# Patient Record
Sex: MALE | Race: WHITE | NOT HISPANIC OR LATINO | Employment: OTHER | URBAN - NONMETROPOLITAN AREA
[De-identification: names, ages, dates, MRNs, and addresses within clinical notes are randomized per-mention and may not be internally consistent; named-entity substitution may affect disease eponyms.]

---

## 2017-03-24 ENCOUNTER — TRANSCRIBE ORDERS (OUTPATIENT)
Dept: ADMINISTRATIVE | Facility: HOSPITAL | Age: 62
End: 2017-03-24

## 2017-03-24 ENCOUNTER — HOSPITAL ENCOUNTER (OUTPATIENT)
Dept: CT IMAGING | Facility: HOSPITAL | Age: 62
Discharge: HOME | End: 2017-03-24

## 2017-03-24 DIAGNOSIS — Z13.9 SCREENING: Primary | ICD-10-CM

## 2017-03-24 PROCEDURE — G0297 LDCT FOR LUNG CA SCREEN: HCPCS

## 2018-05-16 ENCOUNTER — HOSPITAL ENCOUNTER (OUTPATIENT)
Dept: GENERAL RADIOLOGY | Facility: HOSPITAL | Age: 63
Discharge: HOME | End: 2018-05-16
Attending: FAMILY MEDICINE

## 2018-05-16 PROCEDURE — 71046 X-RAY EXAM CHEST 2 VIEWS: CPT

## 2018-07-17 ENCOUNTER — TRANSCRIBE ORDERS (OUTPATIENT)
Dept: ADMINISTRATIVE | Facility: HOSPITAL | Age: 63
End: 2018-07-17

## 2018-07-17 ENCOUNTER — HOSPITAL ENCOUNTER (OUTPATIENT)
Dept: CT IMAGING | Facility: HOSPITAL | Age: 63
Discharge: HOME | End: 2018-07-17

## 2018-07-17 DIAGNOSIS — Z13.9 SCREENING FOR CONDITION: ICD-10-CM

## 2018-07-17 DIAGNOSIS — Z13.9 SCREENING FOR CONDITION: Primary | ICD-10-CM

## 2018-07-17 PROCEDURE — G0297 LDCT FOR LUNG CA SCREEN: HCPCS

## 2019-12-27 ENCOUNTER — HOSPITAL ENCOUNTER (OUTPATIENT)
Dept: GENERAL RADIOLOGY | Age: 64
Discharge: HOME | End: 2019-12-27
Payer: MEDICARE

## 2019-12-27 DIAGNOSIS — J06.9 ACUTE RESPIRATORY DISEASE: ICD-10-CM

## 2019-12-27 PROCEDURE — 71046 X-RAY EXAM CHEST 2 VIEWS: CPT

## 2020-01-28 ENCOUNTER — HOSPITAL ENCOUNTER (OUTPATIENT)
Dept: CT IMAGING | Facility: HOSPITAL | Age: 65
Discharge: HOME | End: 2020-01-28

## 2020-01-28 ENCOUNTER — TRANSCRIBE ORDERS (OUTPATIENT)
Dept: ADMINISTRATIVE | Facility: HOSPITAL | Age: 65
End: 2020-01-28

## 2020-01-28 DIAGNOSIS — Z13.9 SCREENING FOR UNSPECIFIED CONDITION: Primary | ICD-10-CM

## 2020-01-28 PROCEDURE — G0297 LDCT FOR LUNG CA SCREEN: HCPCS

## 2021-03-19 ENCOUNTER — BULK ORDERING (OUTPATIENT)
Dept: CASE MANAGEMENT | Facility: OTHER | Age: 66
End: 2021-03-19

## 2021-03-19 DIAGNOSIS — Z23 IMMUNIZATION DUE: ICD-10-CM

## 2021-03-25 ENCOUNTER — TELEPHONE (OUTPATIENT)
Dept: SURGERY | Age: 66
End: 2021-03-25

## 2021-03-29 ENCOUNTER — HOSPITAL ENCOUNTER (OUTPATIENT)
Dept: ULTRASOUND IMAGING | Age: 66
Discharge: HOME | End: 2021-03-29
Payer: MEDICARE

## 2021-03-29 DIAGNOSIS — K80.10 CALCULUS OF GALLBLADDER WITH CHOLECYSTITIS WITHOUT BILIARY OBSTRUCTION, UNSPECIFIED CHOLECYSTITIS ACUITY: ICD-10-CM

## 2021-03-29 PROCEDURE — 76705 ECHO EXAM OF ABDOMEN: CPT

## 2021-03-31 ENCOUNTER — OFFICE VISIT (OUTPATIENT)
Dept: SURGERY | Age: 66
End: 2021-03-31
Payer: MEDICARE

## 2021-03-31 VITALS
HEIGHT: 72 IN | HEART RATE: 91 BPM | WEIGHT: 242 LBS | BODY MASS INDEX: 32.78 KG/M2 | RESPIRATION RATE: 18 BRPM | SYSTOLIC BLOOD PRESSURE: 124 MMHG | TEMPERATURE: 97.7 F | DIASTOLIC BLOOD PRESSURE: 72 MMHG

## 2021-03-31 DIAGNOSIS — K80.10 CALCULUS OF GALLBLADDER WITH CHRONIC CHOLECYSTITIS WITHOUT OBSTRUCTION: Primary | ICD-10-CM

## 2021-03-31 PROCEDURE — 99203 OFFICE O/P NEW LOW 30 MIN: CPT | Performed by: PHYSICIAN ASSISTANT

## 2021-03-31 PROCEDURE — G8484 FLU IMMUNIZE NO ADMIN: HCPCS | Performed by: PHYSICIAN ASSISTANT

## 2021-03-31 PROCEDURE — G8417 CALC BMI ABV UP PARAM F/U: HCPCS | Performed by: PHYSICIAN ASSISTANT

## 2021-03-31 PROCEDURE — 4040F PNEUMOC VAC/ADMIN/RCVD: CPT | Performed by: PHYSICIAN ASSISTANT

## 2021-03-31 PROCEDURE — 3017F COLORECTAL CA SCREEN DOC REV: CPT | Performed by: PHYSICIAN ASSISTANT

## 2021-03-31 PROCEDURE — 1123F ACP DISCUSS/DSCN MKR DOCD: CPT | Performed by: PHYSICIAN ASSISTANT

## 2021-03-31 PROCEDURE — 1036F TOBACCO NON-USER: CPT | Performed by: PHYSICIAN ASSISTANT

## 2021-03-31 PROCEDURE — G8427 DOCREV CUR MEDS BY ELIG CLIN: HCPCS | Performed by: PHYSICIAN ASSISTANT

## 2021-03-31 RX ORDER — OMEPRAZOLE 20 MG/1
20 CAPSULE, DELAYED RELEASE ORAL DAILY
COMMUNITY
Start: 2021-03-18

## 2021-03-31 RX ORDER — PIOGLITAZONEHYDROCHLORIDE 30 MG/1
30 TABLET ORAL DAILY
COMMUNITY
Start: 2021-03-18

## 2021-03-31 RX ORDER — GLIMEPIRIDE 4 MG/1
4 TABLET ORAL
COMMUNITY

## 2021-03-31 RX ORDER — BENAZEPRIL HYDROCHLORIDE 10 MG/1
10 TABLET ORAL DAILY
COMMUNITY
Start: 2021-03-13

## 2021-03-31 RX ORDER — FENOFIBRATE 145 MG/1
145 TABLET, COATED ORAL DAILY
COMMUNITY
Start: 2021-01-06

## 2021-03-31 RX ORDER — ATORVASTATIN CALCIUM 10 MG/1
10 TABLET, FILM COATED ORAL DAILY
COMMUNITY
Start: 2021-01-05

## 2021-03-31 SDOH — HEALTH STABILITY: MENTAL HEALTH: HOW OFTEN DO YOU HAVE A DRINK CONTAINING ALCOHOL?: 4 OR MORE TIMES A WEEK

## 2021-03-31 SDOH — HEALTH STABILITY: MENTAL HEALTH: HOW MANY STANDARD DRINKS CONTAINING ALCOHOL DO YOU HAVE ON A TYPICAL DAY?: 1 OR 2

## 2021-04-07 ENCOUNTER — HOSPITAL ENCOUNTER (OUTPATIENT)
Dept: PREADMISSION TESTING | Age: 66
Discharge: HOME | End: 2021-04-11
Payer: MEDICARE

## 2021-04-07 VITALS — BODY MASS INDEX: 32.64 KG/M2 | HEIGHT: 72 IN | WEIGHT: 241 LBS

## 2021-04-07 LAB
ALBUMIN SERPL-MCNC: 4.4 G/DL (ref 3.5–5.2)
ALP BLD-CCNC: 63 U/L (ref 40–130)
ALT SERPL-CCNC: 17 U/L (ref 5–41)
ANION GAP SERPL CALCULATED.3IONS-SCNC: 11 MMOL/L (ref 7–19)
AST SERPL-CCNC: 17 U/L (ref 5–40)
BASOPHILS ABSOLUTE: 0.1 K/UL (ref 0–0.2)
BASOPHILS RELATIVE PERCENT: 0.7 % (ref 0–1)
BILIRUB SERPL-MCNC: 0.4 MG/DL (ref 0.2–1.2)
BUN BLDV-MCNC: 19 MG/DL (ref 8–23)
CALCIUM SERPL-MCNC: 9.1 MG/DL (ref 8.8–10.2)
CHLORIDE BLD-SCNC: 102 MMOL/L (ref 98–111)
CO2: 24 MMOL/L (ref 22–29)
CREAT SERPL-MCNC: 1 MG/DL (ref 0.5–1.2)
EKG P AXIS: 49 DEGREES
EKG P-R INTERVAL: 180 MS
EKG Q-T INTERVAL: 368 MS
EKG QRS DURATION: 84 MS
EKG QTC CALCULATION (BAZETT): 413 MS
EKG T AXIS: 35 DEGREES
EOSINOPHILS ABSOLUTE: 0.1 K/UL (ref 0–0.6)
EOSINOPHILS RELATIVE PERCENT: 1.3 % (ref 0–5)
GFR AFRICAN AMERICAN: >59
GFR NON-AFRICAN AMERICAN: >60
GLUCOSE BLD-MCNC: 159 MG/DL (ref 74–109)
HCT VFR BLD CALC: 48.1 % (ref 42–52)
HEMOGLOBIN: 16 G/DL (ref 14–18)
IMMATURE GRANULOCYTES #: 0 K/UL
LYMPHOCYTES ABSOLUTE: 2.1 K/UL (ref 1.1–4.5)
LYMPHOCYTES RELATIVE PERCENT: 29.9 % (ref 20–40)
MCH RBC QN AUTO: 30.8 PG (ref 27–31)
MCHC RBC AUTO-ENTMCNC: 33.3 G/DL (ref 33–37)
MCV RBC AUTO: 92.7 FL (ref 80–94)
MONOCYTES ABSOLUTE: 0.7 K/UL (ref 0–0.9)
MONOCYTES RELATIVE PERCENT: 9.7 % (ref 0–10)
NEUTROPHILS ABSOLUTE: 4.1 K/UL (ref 1.5–7.5)
NEUTROPHILS RELATIVE PERCENT: 57.8 % (ref 50–65)
PDW BLD-RTO: 12.8 % (ref 11.5–14.5)
PLATELET # BLD: 280 K/UL (ref 130–400)
PMV BLD AUTO: 10.9 FL (ref 9.4–12.4)
POTASSIUM SERPL-SCNC: 4.4 MMOL/L (ref 3.5–5)
RBC # BLD: 5.19 M/UL (ref 4.7–6.1)
SARS-COV-2, PCR: NOT DETECTED
SODIUM BLD-SCNC: 137 MMOL/L (ref 136–145)
TOTAL PROTEIN: 6.9 G/DL (ref 6.6–8.7)
WBC # BLD: 7 K/UL (ref 4.8–10.8)

## 2021-04-07 PROCEDURE — 80053 COMPREHEN METABOLIC PANEL: CPT

## 2021-04-07 PROCEDURE — 93005 ELECTROCARDIOGRAM TRACING: CPT | Performed by: SURGERY

## 2021-04-07 PROCEDURE — U0003 INFECTIOUS AGENT DETECTION BY NUCLEIC ACID (DNA OR RNA); SEVERE ACUTE RESPIRATORY SYNDROME CORONAVIRUS 2 (SARS-COV-2) (CORONAVIRUS DISEASE [COVID-19]), AMPLIFIED PROBE TECHNIQUE, MAKING USE OF HIGH THROUGHPUT TECHNOLOGIES AS DESCRIBED BY CMS-2020-01-R: HCPCS

## 2021-04-07 PROCEDURE — U0005 INFEC AGEN DETEC AMPLI PROBE: HCPCS

## 2021-04-07 PROCEDURE — 85025 COMPLETE CBC W/AUTO DIFF WBC: CPT

## 2021-04-07 RX ORDER — ASPIRIN 81 MG/1
81 TABLET ORAL DAILY
COMMUNITY

## 2021-04-07 RX ORDER — SEMAGLUTIDE 1.34 MG/ML
1 INJECTION, SOLUTION SUBCUTANEOUS WEEKLY
COMMUNITY

## 2021-04-08 PROBLEM — K80.10 CALCULUS OF GALLBLADDER WITH CHRONIC CHOLECYSTITIS WITHOUT OBSTRUCTION: Status: ACTIVE | Noted: 2021-04-08

## 2021-04-09 ENCOUNTER — HOSPITAL ENCOUNTER (OUTPATIENT)
Age: 66
Setting detail: OUTPATIENT SURGERY
Discharge: HOME | End: 2021-04-09
Attending: SURGERY | Admitting: SURGERY
Payer: MEDICARE

## 2021-04-09 ENCOUNTER — ANESTHESIA EVENT (OUTPATIENT)
Dept: OPERATING ROOM | Age: 66
End: 2021-04-09
Payer: MEDICARE

## 2021-04-09 ENCOUNTER — ANESTHESIA (OUTPATIENT)
Dept: OPERATING ROOM | Age: 66
End: 2021-04-09
Payer: MEDICARE

## 2021-04-09 VITALS
HEART RATE: 95 BPM | DIASTOLIC BLOOD PRESSURE: 84 MMHG | HEIGHT: 72 IN | TEMPERATURE: 97.8 F | BODY MASS INDEX: 32.64 KG/M2 | SYSTOLIC BLOOD PRESSURE: 132 MMHG | RESPIRATION RATE: 20 BRPM | WEIGHT: 241 LBS | OXYGEN SATURATION: 93 %

## 2021-04-09 VITALS — OXYGEN SATURATION: 91 % | DIASTOLIC BLOOD PRESSURE: 66 MMHG | SYSTOLIC BLOOD PRESSURE: 119 MMHG | TEMPERATURE: 97.3 F

## 2021-04-09 DIAGNOSIS — K80.10 CALCULUS OF GALLBLADDER WITH CHRONIC CHOLECYSTITIS WITHOUT OBSTRUCTION: Primary | ICD-10-CM

## 2021-04-09 PROCEDURE — 2580000003 HC RX 258: Performed by: SURGERY

## 2021-04-09 PROCEDURE — 2580000003 HC RX 258: Performed by: ANESTHESIOLOGY

## 2021-04-09 PROCEDURE — 88304 TISSUE EXAM BY PATHOLOGIST: CPT

## 2021-04-09 PROCEDURE — 3600000009 HC SURGERY ROBOT BASE: Performed by: SURGERY

## 2021-04-09 PROCEDURE — 6360000002 HC RX W HCPCS

## 2021-04-09 PROCEDURE — 3700000001 HC ADD 15 MINUTES (ANESTHESIA): Performed by: SURGERY

## 2021-04-09 PROCEDURE — 7100000010 HC PHASE II RECOVERY - FIRST 15 MIN: Performed by: SURGERY

## 2021-04-09 PROCEDURE — 2500000003 HC RX 250 WO HCPCS

## 2021-04-09 PROCEDURE — C9290 INJ, BUPIVACAINE LIPOSOME: HCPCS | Performed by: SURGERY

## 2021-04-09 PROCEDURE — 6360000002 HC RX W HCPCS: Performed by: SURGERY

## 2021-04-09 PROCEDURE — S2900 ROBOTIC SURGICAL SYSTEM: HCPCS | Performed by: SURGERY

## 2021-04-09 PROCEDURE — 6370000000 HC RX 637 (ALT 250 FOR IP): Performed by: ANESTHESIOLOGY

## 2021-04-09 PROCEDURE — 6360000002 HC RX W HCPCS: Performed by: ANESTHESIOLOGY

## 2021-04-09 PROCEDURE — 2500000003 HC RX 250 WO HCPCS: Performed by: SURGERY

## 2021-04-09 PROCEDURE — 2709999900 HC NON-CHARGEABLE SUPPLY: Performed by: SURGERY

## 2021-04-09 PROCEDURE — 2780000010 HC IMPLANT OTHER: Performed by: SURGERY

## 2021-04-09 PROCEDURE — 7100000011 HC PHASE II RECOVERY - ADDTL 15 MIN: Performed by: SURGERY

## 2021-04-09 PROCEDURE — 7100000001 HC PACU RECOVERY - ADDTL 15 MIN: Performed by: SURGERY

## 2021-04-09 PROCEDURE — 47562 LAPAROSCOPIC CHOLECYSTECTOMY: CPT | Performed by: SURGERY

## 2021-04-09 PROCEDURE — 7100000000 HC PACU RECOVERY - FIRST 15 MIN: Performed by: SURGERY

## 2021-04-09 PROCEDURE — 3700000000 HC ANESTHESIA ATTENDED CARE: Performed by: SURGERY

## 2021-04-09 PROCEDURE — 3600000019 HC SURGERY ROBOT ADDTL 15MIN: Performed by: SURGERY

## 2021-04-09 RX ORDER — MORPHINE SULFATE 4 MG/ML
4 INJECTION, SOLUTION INTRAMUSCULAR; INTRAVENOUS
Status: DISCONTINUED | OUTPATIENT
Start: 2021-04-09 | End: 2021-04-09 | Stop reason: HOSPADM

## 2021-04-09 RX ORDER — ONDANSETRON 2 MG/ML
INJECTION INTRAMUSCULAR; INTRAVENOUS PRN
Status: DISCONTINUED | OUTPATIENT
Start: 2021-04-09 | End: 2021-04-09 | Stop reason: SDUPTHER

## 2021-04-09 RX ORDER — HYDRALAZINE HYDROCHLORIDE 20 MG/ML
5 INJECTION INTRAMUSCULAR; INTRAVENOUS EVERY 10 MIN PRN
Status: DISCONTINUED | OUTPATIENT
Start: 2021-04-09 | End: 2021-04-09 | Stop reason: HOSPADM

## 2021-04-09 RX ORDER — PROMETHAZINE HYDROCHLORIDE 25 MG/ML
6.25 INJECTION, SOLUTION INTRAMUSCULAR; INTRAVENOUS
Status: DISCONTINUED | OUTPATIENT
Start: 2021-04-09 | End: 2021-04-09 | Stop reason: HOSPADM

## 2021-04-09 RX ORDER — DEXAMETHASONE SODIUM PHOSPHATE 10 MG/ML
INJECTION, SOLUTION INTRAMUSCULAR; INTRAVENOUS PRN
Status: DISCONTINUED | OUTPATIENT
Start: 2021-04-09 | End: 2021-04-09 | Stop reason: SDUPTHER

## 2021-04-09 RX ORDER — MEPERIDINE HYDROCHLORIDE 50 MG/ML
12.5 INJECTION INTRAMUSCULAR; INTRAVENOUS; SUBCUTANEOUS EVERY 5 MIN PRN
Status: DISCONTINUED | OUTPATIENT
Start: 2021-04-09 | End: 2021-04-09 | Stop reason: HOSPADM

## 2021-04-09 RX ORDER — FAMOTIDINE 20 MG/1
20 TABLET, FILM COATED ORAL
Status: COMPLETED | OUTPATIENT
Start: 2021-04-09 | End: 2021-04-09

## 2021-04-09 RX ORDER — FENTANYL CITRATE 50 UG/ML
INJECTION, SOLUTION INTRAMUSCULAR; INTRAVENOUS PRN
Status: DISCONTINUED | OUTPATIENT
Start: 2021-04-09 | End: 2021-04-09 | Stop reason: SDUPTHER

## 2021-04-09 RX ORDER — HEPARIN SODIUM 5000 [USP'U]/ML
5000 INJECTION, SOLUTION INTRAVENOUS; SUBCUTANEOUS ONCE
Status: COMPLETED | OUTPATIENT
Start: 2021-04-09 | End: 2021-04-09

## 2021-04-09 RX ORDER — SODIUM CHLORIDE 0.9 % (FLUSH) 0.9 %
5-40 SYRINGE (ML) INJECTION EVERY 12 HOURS SCHEDULED
Status: DISCONTINUED | OUTPATIENT
Start: 2021-04-09 | End: 2021-04-09 | Stop reason: HOSPADM

## 2021-04-09 RX ORDER — HYDROMORPHONE HYDROCHLORIDE 1 MG/ML
0.5 INJECTION, SOLUTION INTRAMUSCULAR; INTRAVENOUS; SUBCUTANEOUS EVERY 5 MIN PRN
Status: DISCONTINUED | OUTPATIENT
Start: 2021-04-09 | End: 2021-04-09 | Stop reason: HOSPADM

## 2021-04-09 RX ORDER — MORPHINE SULFATE 4 MG/ML
2 INJECTION, SOLUTION INTRAMUSCULAR; INTRAVENOUS EVERY 5 MIN PRN
Status: DISCONTINUED | OUTPATIENT
Start: 2021-04-09 | End: 2021-04-09 | Stop reason: HOSPADM

## 2021-04-09 RX ORDER — APREPITANT 40 MG/1
40 CAPSULE ORAL ONCE
Status: COMPLETED | OUTPATIENT
Start: 2021-04-09 | End: 2021-04-09

## 2021-04-09 RX ORDER — SCOLOPAMINE TRANSDERMAL SYSTEM 1 MG/1
1 PATCH, EXTENDED RELEASE TRANSDERMAL ONCE
Status: DISCONTINUED | OUTPATIENT
Start: 2021-04-09 | End: 2021-04-09 | Stop reason: HOSPADM

## 2021-04-09 RX ORDER — SODIUM CHLORIDE, SODIUM LACTATE, POTASSIUM CHLORIDE, CALCIUM CHLORIDE 600; 310; 30; 20 MG/100ML; MG/100ML; MG/100ML; MG/100ML
INJECTION, SOLUTION INTRAVENOUS CONTINUOUS
Status: DISCONTINUED | OUTPATIENT
Start: 2021-04-09 | End: 2021-04-09 | Stop reason: HOSPADM

## 2021-04-09 RX ORDER — METOCLOPRAMIDE 10 MG/1
10 TABLET ORAL ONCE
Status: COMPLETED | OUTPATIENT
Start: 2021-04-09 | End: 2021-04-09

## 2021-04-09 RX ORDER — ROCURONIUM BROMIDE 10 MG/ML
INJECTION, SOLUTION INTRAVENOUS PRN
Status: DISCONTINUED | OUTPATIENT
Start: 2021-04-09 | End: 2021-04-09 | Stop reason: SDUPTHER

## 2021-04-09 RX ORDER — SODIUM CHLORIDE 0.9 % (FLUSH) 0.9 %
5-40 SYRINGE (ML) INJECTION PRN
Status: DISCONTINUED | OUTPATIENT
Start: 2021-04-09 | End: 2021-04-09 | Stop reason: HOSPADM

## 2021-04-09 RX ORDER — MORPHINE SULFATE 4 MG/ML
4 INJECTION, SOLUTION INTRAMUSCULAR; INTRAVENOUS EVERY 5 MIN PRN
Status: DISCONTINUED | OUTPATIENT
Start: 2021-04-09 | End: 2021-04-09 | Stop reason: HOSPADM

## 2021-04-09 RX ORDER — HYDROCODONE BITARTRATE AND ACETAMINOPHEN 5; 325 MG/1; MG/1
1 TABLET ORAL EVERY 4 HOURS PRN
Qty: 18 TABLET | Refills: 0 | Status: SHIPPED | OUTPATIENT
Start: 2021-04-09 | End: 2021-04-12

## 2021-04-09 RX ORDER — EPHEDRINE SULFATE 50 MG/ML
INJECTION, SOLUTION INTRAVENOUS PRN
Status: DISCONTINUED | OUTPATIENT
Start: 2021-04-09 | End: 2021-04-09 | Stop reason: SDUPTHER

## 2021-04-09 RX ORDER — DIPHENHYDRAMINE HYDROCHLORIDE 50 MG/ML
12.5 INJECTION INTRAMUSCULAR; INTRAVENOUS
Status: DISCONTINUED | OUTPATIENT
Start: 2021-04-09 | End: 2021-04-09 | Stop reason: HOSPADM

## 2021-04-09 RX ORDER — INDOCYANINE GREEN AND WATER 25 MG
KIT INJECTION PRN
Status: DISCONTINUED | OUTPATIENT
Start: 2021-04-09 | End: 2021-04-09 | Stop reason: ALTCHOICE

## 2021-04-09 RX ORDER — HYDROXYZINE HYDROCHLORIDE 25 MG/ML
25 INJECTION, SOLUTION INTRAMUSCULAR
Status: DISCONTINUED | OUTPATIENT
Start: 2021-04-09 | End: 2021-04-09 | Stop reason: HOSPADM

## 2021-04-09 RX ORDER — LABETALOL HYDROCHLORIDE 5 MG/ML
5 INJECTION, SOLUTION INTRAVENOUS EVERY 10 MIN PRN
Status: DISCONTINUED | OUTPATIENT
Start: 2021-04-09 | End: 2021-04-09 | Stop reason: HOSPADM

## 2021-04-09 RX ORDER — PROPOFOL 10 MG/ML
INJECTION, EMULSION INTRAVENOUS PRN
Status: DISCONTINUED | OUTPATIENT
Start: 2021-04-09 | End: 2021-04-09 | Stop reason: SDUPTHER

## 2021-04-09 RX ORDER — METOCLOPRAMIDE HYDROCHLORIDE 5 MG/ML
10 INJECTION INTRAMUSCULAR; INTRAVENOUS
Status: DISCONTINUED | OUTPATIENT
Start: 2021-04-09 | End: 2021-04-09 | Stop reason: HOSPADM

## 2021-04-09 RX ORDER — MIDAZOLAM HYDROCHLORIDE 1 MG/ML
2 INJECTION INTRAMUSCULAR; INTRAVENOUS
Status: COMPLETED | OUTPATIENT
Start: 2021-04-09 | End: 2021-04-09

## 2021-04-09 RX ORDER — SODIUM CHLORIDE 9 MG/ML
25 INJECTION, SOLUTION INTRAVENOUS PRN
Status: DISCONTINUED | OUTPATIENT
Start: 2021-04-09 | End: 2021-04-09 | Stop reason: HOSPADM

## 2021-04-09 RX ORDER — FENTANYL CITRATE 50 UG/ML
50 INJECTION, SOLUTION INTRAMUSCULAR; INTRAVENOUS
Status: DISCONTINUED | OUTPATIENT
Start: 2021-04-09 | End: 2021-04-09 | Stop reason: HOSPADM

## 2021-04-09 RX ORDER — LIDOCAINE HYDROCHLORIDE 10 MG/ML
1 INJECTION, SOLUTION EPIDURAL; INFILTRATION; INTRACAUDAL; PERINEURAL
Status: DISCONTINUED | OUTPATIENT
Start: 2021-04-09 | End: 2021-04-09 | Stop reason: HOSPADM

## 2021-04-09 RX ORDER — HYDROMORPHONE HYDROCHLORIDE 1 MG/ML
0.25 INJECTION, SOLUTION INTRAMUSCULAR; INTRAVENOUS; SUBCUTANEOUS EVERY 5 MIN PRN
Status: DISCONTINUED | OUTPATIENT
Start: 2021-04-09 | End: 2021-04-09 | Stop reason: HOSPADM

## 2021-04-09 RX ORDER — LIDOCAINE HYDROCHLORIDE 10 MG/ML
INJECTION, SOLUTION EPIDURAL; INFILTRATION; INTRACAUDAL; PERINEURAL PRN
Status: DISCONTINUED | OUTPATIENT
Start: 2021-04-09 | End: 2021-04-09 | Stop reason: SDUPTHER

## 2021-04-09 RX ADMIN — APREPITANT 40 MG: 40 CAPSULE ORAL at 10:59

## 2021-04-09 RX ADMIN — FAMOTIDINE 20 MG: 20 TABLET, FILM COATED ORAL at 10:59

## 2021-04-09 RX ADMIN — HYDROMORPHONE HYDROCHLORIDE 0.5 MG: 1 INJECTION, SOLUTION INTRAMUSCULAR; INTRAVENOUS; SUBCUTANEOUS at 13:21

## 2021-04-09 RX ADMIN — HEPARIN SODIUM 5000 UNITS: 5000 INJECTION INTRAVENOUS; SUBCUTANEOUS at 10:59

## 2021-04-09 RX ADMIN — Medication 2000 MG: at 11:50

## 2021-04-09 RX ADMIN — HYDROMORPHONE HYDROCHLORIDE 0.5 MG: 1 INJECTION, SOLUTION INTRAMUSCULAR; INTRAVENOUS; SUBCUTANEOUS at 13:08

## 2021-04-09 RX ADMIN — METOCLOPRAMIDE 10 MG: 10 TABLET ORAL at 10:59

## 2021-04-09 RX ADMIN — EPHEDRINE SULFATE 10 MG: 50 INJECTION INTRAMUSCULAR; INTRAVENOUS; SUBCUTANEOUS at 11:58

## 2021-04-09 RX ADMIN — FENTANYL CITRATE 50 MCG: 50 INJECTION, SOLUTION INTRAMUSCULAR; INTRAVENOUS at 12:10

## 2021-04-09 RX ADMIN — LIDOCAINE HYDROCHLORIDE 50 MG: 10 INJECTION, SOLUTION EPIDURAL; INFILTRATION; INTRACAUDAL; PERINEURAL at 11:39

## 2021-04-09 RX ADMIN — SODIUM CHLORIDE, SODIUM LACTATE, POTASSIUM CHLORIDE, AND CALCIUM CHLORIDE: 600; 310; 30; 20 INJECTION, SOLUTION INTRAVENOUS at 12:40

## 2021-04-09 RX ADMIN — FENTANYL CITRATE 50 MCG: 50 INJECTION, SOLUTION INTRAMUSCULAR; INTRAVENOUS at 12:42

## 2021-04-09 RX ADMIN — MIDAZOLAM 2 MG: 1 INJECTION INTRAMUSCULAR; INTRAVENOUS at 11:04

## 2021-04-09 RX ADMIN — SODIUM CHLORIDE, SODIUM LACTATE, POTASSIUM CHLORIDE, AND CALCIUM CHLORIDE: 600; 310; 30; 20 INJECTION, SOLUTION INTRAVENOUS at 10:54

## 2021-04-09 RX ADMIN — SUGAMMADEX 250 MG: 100 INJECTION, SOLUTION INTRAVENOUS at 12:50

## 2021-04-09 RX ADMIN — ROCURONIUM BROMIDE 50 MG: 10 INJECTION, SOLUTION INTRAVENOUS at 11:39

## 2021-04-09 RX ADMIN — ONDANSETRON HYDROCHLORIDE 4 MG: 2 INJECTION, SOLUTION INTRAMUSCULAR; INTRAVENOUS at 11:53

## 2021-04-09 RX ADMIN — PROPOFOL 150 MG: 10 INJECTION, EMULSION INTRAVENOUS at 11:39

## 2021-04-09 RX ADMIN — FENTANYL CITRATE 50 MCG: 50 INJECTION, SOLUTION INTRAMUSCULAR; INTRAVENOUS at 11:39

## 2021-04-09 RX ADMIN — DEXAMETHASONE SODIUM PHOSPHATE 10 MG: 10 INJECTION, SOLUTION INTRAMUSCULAR; INTRAVENOUS at 11:53

## 2021-04-09 ASSESSMENT — ENCOUNTER SYMPTOMS
ABDOMINAL PAIN: 1
BACK PAIN: 0
CONSTIPATION: 0
SHORTNESS OF BREATH: 0
EYE REDNESS: 0
COUGH: 0
SHORTNESS OF BREATH: 0
COLOR CHANGE: 0
EYE PAIN: 0
NAUSEA: 0
VOMITING: 0
DIARRHEA: 1
ABDOMINAL DISTENTION: 0
SORE THROAT: 0
CHEST TIGHTNESS: 0

## 2021-04-09 ASSESSMENT — LIFESTYLE VARIABLES: SMOKING_STATUS: 0

## 2021-04-09 ASSESSMENT — PAIN DESCRIPTION - PAIN TYPE: TYPE: SURGICAL PAIN

## 2021-04-09 ASSESSMENT — PAIN DESCRIPTION - LOCATION: LOCATION: ABDOMEN

## 2021-04-09 ASSESSMENT — PAIN SCALES - GENERAL: PAINLEVEL_OUTOF10: 0

## 2021-04-09 ASSESSMENT — PAIN DESCRIPTION - DESCRIPTORS: DESCRIPTORS: ACHING

## 2021-04-09 NOTE — PROGRESS NOTES
HISTORY OF PRESENT ILLNESS:  Radha Ceballos is a 24-year-old patient of Dr. Jody Gillespie. He presents with postprandial abdominal pain worse in the right upper quadrant. Is also has a mid epigastric tenderness. He also has some associated symptoms of bloating and diarrhea after eating. He denies fevers chills or jaundice. He has had ultrasound gallbladder which is shown cholelithiasis. Common bile duct noted be normal in size. Gallbladder wall thickness is normal.    We discussed the pathophysiology of gallbladder disease and the risk benefits and alternatives of surgery. Emir Escobar wishes to proceed. Patient Active Problem List    Diagnosis Date Noted    Calculus of gallbladder with chronic cholecystitis without obstruction 04/08/2021     Current Outpatient Medications   Medication Sig Dispense Refill    glimepiride (AMARYL) 4 MG tablet Take 4 mg by mouth every morning (before breakfast) Indications: Diabetes       omeprazole (PRILOSEC) 20 MG delayed release capsule Take 20 mg by mouth Daily Indications: Reflux Gastritis       metFORMIN (GLUCOPHAGE) 850 MG tablet Take 850 mg by mouth 2 times daily (with meals) Indications: Diabetes       benazepril (LOTENSIN) 10 MG tablet Take 10 mg by mouth daily Indications: High Blood Pressure Disorder       fenofibrate (TRICOR) 145 MG tablet Take 145 mg by mouth daily Indications: Changes in Cholesterol       atorvastatin (LIPITOR) 10 MG tablet Take 10 mg by mouth daily Indications: Changes in Cholesterol       pioglitazone (ACTOS) 30 MG tablet Take 30 mg by mouth daily Indications: Diabetes       dapagliflozin (FARXIGA) 10 MG tablet Take 10 mg by mouth every morning Indications: Diabetes       aspirin 81 MG EC tablet Take 81 mg by mouth daily      Semaglutide, 1 MG/DOSE, (OZEMPIC, 1 MG/DOSE,) 2 MG/1.5ML SOPN Inject into the skin once a week Indications: Diabetes       No current facility-administered medications for this visit.       Allergies: Patient has no known allergies. Past Medical History:   Diagnosis Date    Bladder cancer ([facility] [address] 1999    Diabetes mellitus ([facility]     Gallstones     Hyperlipidemia     Hypertension      Past Surgical History:   Procedure Laterality Date    BACK SURGERY  1993    NECK SURGERY  2000     Family History   Problem Relation Age of Onset    Diabetes Other     Heart Disease Other      Social History     Tobacco Use    Smoking status: Former Smoker     Packs/day: 2.00     Years: 20.00     Pack years: 40.00     Types: Cigarettes    Smokeless tobacco: Never Used   Substance Use Topics    Alcohol use: Yes     Alcohol/week: 14.0 standard drinks     Types: 14 Cans of beer per week     Frequency: 4 or more times a week     Drinks per session: 1 or 2     Binge frequency: Never       Review of Systems   Reviewed and positive for the above all others as noted be negative    Physical Exam  Blood pressure 124/72, pulse 91, temperature 97.7 °F (36.5 °C), temperature source Temporal, resp. rate 18, height 6' (1.829 m), weight 242 lb (109.8 kg). GENERAL:  Reveals a 77 y.o. male that  appears to be in no acute distress. HEENT:  Head is normocephalic and atraumatic. NECK:  Neck is supple without masses or carotid bruits. CHEST:  Patient has normal respiratory effort. Chest is clear bilaterally with good thoracic expansion. HEART:  Heart demonstrated a regular rhythm and rate with no cardiac murmurs, gallops or rubs noted to auscultation. ABDOMEN:  Inspection of the abdomen demonstrated the patient to have normal bowel sounds present. The abdomen is soft and nontender. EXTREMITIES:  Extremities demonstrated no cyanosis or pitting edema bilaterally. PSYCHIATRIC:  Patient is oriented to time, place and person. The patient is very nervous. .      IMPRESSION:  Chronic Cholecystitis with Cholelithiasis      PLAN:  The risks, benefits, and options were discussed with the patient. He  is willing to proceed with surgery.

## 2021-04-09 NOTE — H&P
[facility] Surgery History & Physical    Chief Complaint:  Abd Pain, Gallstones. SUBJECTIVE:  Mr. Nichole Mitchell is a 77 y.o. male who presents today with recurrent episodes of epigastric abdominal pain. He was found to have gallstones and was evaluated in the office by the pA for cholecystectomy. He presents today with the same complaints and would like to proceed with surgery.        Past Medical History:   Diagnosis Date    Bladder cancer ([facility] [address] 1999    Diabetes mellitus ([facility] [address]     Gallstones     Hyperlipidemia     Hypertension      Past Surgical History:   Procedure Laterality Date    BACK SURGERY  1993    NECK SURGERY  2000     Current Facility-Administered Medications   Medication Dose Route Frequency Provider Last Rate Last Admin    ceFAZolin (ANCEF) 2000 mg in 0.9% sodium chloride 50 mL IVPB  2,000 mg Intravenous Once Pearl Hernandez DO        lactated ringers infusion   Intravenous Continuous Luz Silva DO        meperidine (DEMEROL) injection 12.5 mg  12.5 mg Intravenous Q5 Min PRN Isiah Smith MD        morphine injection 2 mg  2 mg Intravenous Q5 Min PRN Isiah Smith MD        morphine injection 4 mg  4 mg Intravenous Q5 Min PRN Isiah Smith MD        HYDROmorphone HCl PF (DILAUDID) injection 0.25 mg  0.25 mg Intravenous Q5 Min PRN Isiah Smith MD        HYDROmorphone HCl PF (DILAUDID) injection 0.5 mg  0.5 mg Intravenous Q5 Min PRN Isiah Smith MD        promethazine (PHENERGAN) injection 6.25 mg  6.25 mg Intravenous Once PRN Isiah Smith MD        metoclopramide (REGLAN) injection 10 mg  10 mg Intravenous Once PRN Isiah Smith MD        diphenhydrAMINE (BENADRYL) injection 12.5 mg  12.5 mg Intravenous Once PRN Isiah Smith MD        labetalol (NORMODYNE;TRANDATE) injection 5 mg  5 mg Intravenous Q10 Min PRN Isiah Smith MD        hydrALAZINE (APRESOLINE) injection 5 mg  5 mg Intravenous Q10 Min PRN Isiah Smith MD        morphine injection 4 mg  4 mg Intravenous Once PRN Isiah Smith MD        fentaNYL (SUBLIMAZE) injection 50 mcg  50 mcg Intravenous Once PRN Isiah Smith MD        lactated ringers infusion   Intravenous Continuous Isiah Smith  mL/hr at 21 1054 New Bag at 21 1054    sodium chloride flush 0.9 % injection 5-40 mL  5-40 mL Intravenous 2 times per day Isiah Smith MD        sodium chloride flush 0.9 % injection 5-40 mL  5-40 mL Intravenous PRN Isiah Smith MD        0.9 % sodium chloride infusion  25 mL Intravenous PRN Isiah MD Sarah        famotidine (PEPCID) injection 20 mg  20 mg Intravenous Once Isiah MD Sarah        scopolamine (TRANSDERM-SCOP) transdermal patch 1 patch  1 patch Transdermal Once Isiah Smith MD   1 patch at 21 1059    lidocaine PF 1 % injection 1 mL  1 mL Intradermal Once PRN Isiah MD Sarah        hydrOXYzine (VISTARIL) injection 25 mg  25 mg Intramuscular Once PRN Isiah Smith MD         Allergies: Patient has no known allergies. Family History   Problem Relation Age of Onset    Diabetes Other     Heart Disease Other        Social History     Tobacco Use    Smoking status: Former Smoker     Packs/day: 2.00     Years: 20.00     Pack years: 40.00     Types: Cigarettes     Quit date:      Years since quittin.    Smokeless tobacco: Never Used   Substance Use Topics    Alcohol use: Yes     Frequency: 4 or more times a week     Drinks per session: 1 or 2     Binge frequency: Never     Comment: \"beer a daily\"       Review of Systems   Constitutional: Positive for appetite change. Negative for fatigue, fever and unexpected weight change. HENT: Negative for hearing loss, nosebleeds and sore throat. Eyes: Negative for pain, redness and visual disturbance. Respiratory: Negative for cough, chest tightness and shortness of breath. Cardiovascular: Negative for chest pain, palpitations and leg swelling.    Gastrointestinal: Positive for abdominal pain and diarrhea. Negative for abdominal distention, constipation, nausea and vomiting. Endocrine: Positive for polydipsia, polyphagia and polyuria. Negative for cold intolerance and heat intolerance. Genitourinary: Negative for difficulty urinating, frequency and urgency. Musculoskeletal: Negative for back pain, joint swelling and neck pain. Skin: Negative for color change, rash and wound. Allergic/Immunologic: Negative for environmental allergies and food allergies. Neurological: Negative for seizures, light-headedness and headaches. Hematological: Negative for adenopathy. Does not bruise/bleed easily. Psychiatric/Behavioral: Negative for confusion, sleep disturbance and suicidal ideas. OBJECTIVE:  /78   Pulse 67   Temp 98.5 °F (36.9 °C) (Temporal)   Resp 18   Ht 6' (1.829 m)   Wt 241 lb (109.3 kg)   SpO2 96%   BMI 32.69 kg/m²   CONSTITUTIONAL: Alert, appropriate, no acute distress. Obese. SKIN: warm, dry with no rashes or lesions  HEENT: NCAT, Non icteric, PERR. Trachea Midline. CHEST/LUNGS: CTA bilaterally. Normal respiratory effort. CARDIOVASCULAR: RRR, No edema. ABDOMEN: soft, ND, Non-TTP, +BS  NEUROLOGIC: CN II-XI grossly intact, no motor or sensory deficits   MUSCULOSKELETAL: No clubbing or cyanosis. PSYCHIATRIC:  Normal Mood and Affect. Alert and Oriented. LABS:  CBC:   Recent Labs     04/07/21  1055   WBC 7.0   HGB 16.0        BMP:    Recent Labs     04/07/21  1055      K 4.4      CO2 24   BUN 19   CREATININE 1.0   GLUCOSE 159*       ASSESSMENT:    Calculus of gallbladder with chronic cholecystitis without obstruction    PLAN:  The risks, benefits, and alternatives were discussed with the pt. He is willing to accept the risks and proceed with a robotic cholecystectomy with ICG.  The surgical risks included but not limited to bleeding, infection, perforation, recurrence, risk of needing further surgery, etc. The anesthetic risks included heart attacks, strokes, pneumonia, phlebitis, etc.  He is willing to accept all risks and proceed with surgery. No guarantees have been given.       Haroldo Delgado DO   Electronically Signed on 4/9/2021 at 11:07 AM

## 2021-04-09 NOTE — ANESTHESIA POSTPROCEDURE EVALUATION
Department of Anesthesiology  Postprocedure Note    Patient: Sandra Fox  MRN: [id number]  YOB: 1955  Date of evaluation: 4/9/2021  Time:  1:00 PM     Procedure Summary     Date: 04/09/21 Room / Location: [address]    Anesthesia Start: 5654 Anesthesia Stop: 1300    Procedure: ROBOTIC  LAPAROSCOPIC CHOLECYSTECTOMY WITH ICG (N/A ) Diagnosis: (CHOLECYSTITIS WITH CHOLELITHIASIS)    Surgeons: Jc Munson DO Responsible Provider: SHAHNAZ Stein CRNA    Anesthesia Type: general ASA Status: 3          Anesthesia Type: general    Rebekah Phase I: Rebekah Score: 6    Rebekah Phase II:      Last vitals: Reviewed and per EMR flowsheets.        Anesthesia Post Evaluation    Patient location during evaluation: PACU  Patient participation: complete - patient participated  Level of consciousness: awake and alert  Pain score: 0  Airway patency: patent  Nausea & Vomiting: no nausea and no vomiting  Complications: no  Cardiovascular status: hemodynamically stable and blood pressure returned to baseline  Respiratory status: acceptable and room air  Hydration status: stable

## 2021-04-09 NOTE — ANESTHESIA PRE PROCEDURE
Department of Anesthesiology  Preprocedure Note       Name:  Renan Amor   Age:  77 y.o.  :  1955                                          MRN:  [id number]         Date:  2021      Surgeon: Ten Stratton):  Mekhi Gonzalez DO    Procedure: Procedure(s):  ROBOTIC  LAPAROSCOPIC CHOLECYSTECTOMY WITH ICG    Medications prior to admission:   Prior to Admission medications    Medication Sig Start Date End Date Taking?  Authorizing Provider   aspirin 81 MG EC tablet Take 81 mg by mouth daily    Historical Provider, MD   Semaglutide, 1 MG/DOSE, (OZEMPIC, 1 MG/DOSE,) 2 MG/1.5ML SOPN Inject into the skin once a week Indications: Diabetes    Historical Provider, MD   glimepiride (AMARYL) 4 MG tablet Take 4 mg by mouth every morning (before breakfast) Indications: Diabetes     Historical Provider, MD   omeprazole (PRILOSEC) 20 MG delayed release capsule Take 20 mg by mouth Daily Indications: Reflux Gastritis  3/18/21   Historical Provider, MD   metFORMIN (GLUCOPHAGE) 850 MG tablet Take 850 mg by mouth 2 times daily (with meals) Indications: Diabetes  3/22/21   Historical Provider, MD   benazepril (LOTENSIN) 10 MG tablet Take 10 mg by mouth daily Indications: High Blood Pressure Disorder  3/13/21   Historical Provider, MD   fenofibrate (TRICOR) 145 MG tablet Take 145 mg by mouth daily Indications: Changes in Cholesterol  21   Historical Provider, MD   atorvastatin (LIPITOR) 10 MG tablet Take 10 mg by mouth daily Indications: Changes in Cholesterol  21   Historical Provider, MD   pioglitazone (ACTOS) 30 MG tablet Take 30 mg by mouth daily Indications: Diabetes  3/18/21   Historical Provider, MD   dapagliflozin (FARXIGA) 10 MG tablet Take 10 mg by mouth every morning Indications: Diabetes     Historical Provider, MD       Current medications:    Current Facility-Administered Medications   Medication Dose Route Frequency Provider Last Rate Last Admin    ceFAZolin (ANCEF) 2000 mg in 0.9% sodium chloride 50 mL IVPB 2,000 mg Intravenous Once Pearl Hernandez DO        heparin (porcine) injection 5,000 Units  5,000 Units Subcutaneous Once Pearl Hernandez, DO        lactated ringers infusion   Intravenous Continuous Evert Senate, DO           Allergies:  No Known Allergies    Problem List:    Patient Active Problem List   Diagnosis Code    Calculus of gallbladder with chronic cholecystitis without obstruction K80.10       Past Medical History:        Diagnosis Date    Bladder cancer ([facility] [address] 1999    Diabetes mellitus ([facility] [address]     Gallstones     Hyperlipidemia     Hypertension        Past Surgical History:        Procedure Laterality Date    BACK SURGERY  1993    NECK SURGERY  2000       Social History:    Social History     Tobacco Use    Smoking status: Former Smoker     Packs/day: 2.00     Years: 20.00     Pack years: 40.00     Types: Cigarettes    Smokeless tobacco: Never Used   Substance Use Topics    Alcohol use: Yes     Alcohol/week: 14.0 standard drinks     Types: 14 Cans of beer per week     Frequency: 4 or more times a week     Drinks per session: 1 or 2     Binge frequency: Never                                Counseling given: Not Answered      Vital Signs (Current): There were no vitals filed for this visit.                                            BP Readings from Last 3 Encounters:   03/31/21 124/72       NPO Status:                                                                                 BMI:   Wt Readings from Last 3 Encounters:   04/07/21 241 lb (109.3 kg)   03/31/21 242 lb (109.8 kg)     There is no height or weight on file to calculate BMI.    CBC:   Lab Results   Component Value Date    WBC 7.0 04/07/2021    RBC 5.19 04/07/2021    HGB 16.0 04/07/2021    HCT 48.1 04/07/2021    MCV 92.7 04/07/2021    RDW 12.8 04/07/2021     04/07/2021       CMP:   Lab Results   Component Value Date     04/07/2021    K 4.4 04/07/2021     04/07/2021    CO2 24 04/07/2021    BUN 19 04/07/2021 CREATININE 1.0 04/07/2021    GFRAA >59 04/07/2021    LABGLOM >60 04/07/2021    GLUCOSE 159 04/07/2021    PROT 6.9 04/07/2021    CALCIUM 9.1 04/07/2021    BILITOT 0.4 04/07/2021    ALKPHOS 63 04/07/2021    AST 17 04/07/2021    ALT 17 04/07/2021       POC Tests: No results for input(s): POCGLU, POCNA, POCK, POCCL, POCBUN, POCHEMO, POCHCT in the last 72 hours. Coags: No results found for: PROTIME, INR, APTT    HCG (If Applicable): No results found for: PREGTESTUR, PREGSERUM, HCG, HCGQUANT     ABGs: No results found for: PHART, PO2ART, DED9DON, JIL4TKZ, BEART, Y1OXTPME     Type & Screen (If Applicable):  No results found for: LABABO, LABRH    Drug/Infectious Status (If Applicable):  No results found for: HIV, HEPCAB    COVID-19 Screening (If Applicable):   Lab Results   Component Value Date    COVID19 Not Detected 04/07/2021           Anesthesia Evaluation  Patient summary reviewed and Nursing notes reviewed   history of anesthetic complications: PONV. Airway: Mallampati: II  TM distance: >3 FB   Neck ROM: full  Mouth opening: > = 3 FB Dental: normal exam         Pulmonary:Negative Pulmonary ROS and normal exam  breath sounds clear to auscultation      (-) shortness of breath and not a current smoker          Patient did not smoke on day of surgery. Cardiovascular:    (+) hypertension:, hyperlipidemia    (-) CAD,  angina and  CHF    NYHA Classification: I  ECG reviewed  Rhythm: regular  Rate: normal           Beta Blocker:  Not on Beta Blocker         Neuro/Psych:   Negative Neuro/Psych ROS     (-) seizures, CVA and depression/anxiety            GI/Hepatic/Renal: Neg GI/Hepatic/Renal ROS  (+) morbid obesity     (-) hiatal hernia and GERD       Endo/Other: Negative Endo/Other ROS   (+) DiabetesType II DM, , malignancy/cancer. Pt had PAT visit. Abdominal:       Abdomen: soft.     Vascular:                                        Anesthesia Plan      general     ASA 3     (Due to high risk of ponv, will plan on a multimodal antiemetic regimen. (Scopalamine, emend, pepcid, zofran and perhaps decadron) unless contraindicated in this patient  )  Induction: intravenous. BIS  MIPS: Postoperative opioids intended and Prophylactic antiemetics administered. Anesthetic plan and risks discussed with patient. Use of blood products discussed with patient whom. Plan discussed with CRNA.     Attending anesthesiologist reviewed and agrees with Pre Eval content              Jorgito Skelton MD   4/9/2021

## 2021-04-09 NOTE — OP NOTE
used to visualize the cystic duct and biliary anatomy. The neck of the gallbladder was dissected with blunt and electorcautery to visualize the cystic duct. This was cleared of  surrounding tissue. Adjacent to this the cystic artery was identified and also cleared of surrounding tissue. The triangle of Calot was completely dissected and an excellent critical view of safety obtained. The cystic duct was clipped proximally and distally with hemoclips and divided. The  cystic artery was divided in a similar fashion. The gallbladder was then  released from the undersurface of the liver using cautery. Once free, it was  placed it in an Endocatch retrieval bag and removed through the umbilical incision  without problem. Dome of the liver, right gutter, and subhepatic space were irrigated and the irrigant  removed with suction. Gallbladder fossa was without bleeding. The cystic  duct and cystic artery stumps were well seen with clips across each and no  evidence of bleeding or bile leak. The working ports were removed under vision with no bleeding noted. The  pneumoperitoneum was released and the umbilical port removed. Fascia at the umbilicus was reapproximated with 0 Vicryl suture. Each port site was injected with 10cc Experel. Skin incisions were closed with interrupted 4-0 Monocryl subcuticular suture followed by Dermabond dressing. Sponge, needle, instrument count correct on 2 occasions. Estimated intraoperative blood loss, minimal.    Mr. Axel Palomo tolerated his surgery well and he was taken to PACU in  satisfactory condition.         ________________________________  Fifi Mariscal DO            Electronically signed by Fifi Mariscal DO on 3/1/4153 at 12:46 PM

## 2021-04-22 ENCOUNTER — OFFICE VISIT (OUTPATIENT)
Dept: SURGERY | Age: 66
End: 2021-04-22

## 2021-04-22 VITALS
BODY MASS INDEX: 32.67 KG/M2 | HEIGHT: 72 IN | DIASTOLIC BLOOD PRESSURE: 70 MMHG | TEMPERATURE: 98 F | SYSTOLIC BLOOD PRESSURE: 110 MMHG | WEIGHT: 241.2 LBS

## 2021-04-22 DIAGNOSIS — R19.7 DIARRHEA, UNSPECIFIED TYPE: Primary | ICD-10-CM

## 2021-04-22 PROCEDURE — 99024 POSTOP FOLLOW-UP VISIT: CPT | Performed by: SURGERY

## 2021-04-22 RX ORDER — MONTELUKAST SODIUM 4 MG/1
1 TABLET, CHEWABLE ORAL 2 TIMES DAILY
Qty: 60 TABLET | Refills: 3 | Status: SHIPPED | OUTPATIENT
Start: 2021-04-22

## 2021-04-22 NOTE — PROGRESS NOTES
S: Mr. Suhail Ortega returns today for a post op visit 2 weeks post laparoscopic cholecystectomy. Since hospital discharge he has done well. His post op pain has resolved and he has not had any episodes of biliary colic. No fever or chills reported. No problem with his incisions and he has returned to his usual activities. His appetite is back to normal but  he has had persistent diarrhea. He states after all meals he has an urgency to     O: Abdomen is soft and non tender. Laparoscopy incisions are nicely healed. No mass appreciated, bowel sounds are normal.     Pathology report:     Gallbladder, cholecystectomy:   1.  Mild chronic cholecystitis. 2.  Cholelithiasis.    CBG/CBG     A: 1) Satisfactory recovery post laparoscopic cholecystectomy. P:   Will start colestid to see if that helps with his diarrhea. He is seeing GI at [facility] for colonoscopy planning. Stressed importance of following through with that and he notes understanding. Encouraged him to discuss his anxiety with Dr. Linda Wade. F/u prn.

## 2021-05-24 ENCOUNTER — OFFICE VISIT (OUTPATIENT)
Dept: GASTROENTEROLOGY | Facility: CLINIC | Age: 66
End: 2021-05-24

## 2021-05-24 VITALS
HEIGHT: 72 IN | HEART RATE: 96 BPM | BODY MASS INDEX: 32.37 KG/M2 | DIASTOLIC BLOOD PRESSURE: 66 MMHG | TEMPERATURE: 96.8 F | OXYGEN SATURATION: 98 % | WEIGHT: 239 LBS | SYSTOLIC BLOOD PRESSURE: 124 MMHG

## 2021-05-24 DIAGNOSIS — R19.4 CHANGE IN BOWEL HABITS: Primary | ICD-10-CM

## 2021-05-24 DIAGNOSIS — D12.6 ADENOMATOUS POLYP OF COLON, UNSPECIFIED PART OF COLON: ICD-10-CM

## 2021-05-24 PROCEDURE — 99202 OFFICE O/P NEW SF 15 MIN: CPT | Performed by: INTERNAL MEDICINE

## 2021-05-24 RX ORDER — ASPIRIN 81 MG/1
81 TABLET ORAL DAILY
COMMUNITY

## 2021-05-24 NOTE — PROGRESS NOTES
[facility] Gastroenterology    Chief Complaint   Patient presents with   • GI Problem     change in bowel habits       Subjective     HPI    Paulo Barnes is a 65 y.o. male who presents with a chief complaint of change in bowel habits.    He states for a long time maybe even years he has had intermittent abdominal cramping, bloating and loose stools.  He had some urgency.  Usually bother him more when he ate something that he should have eaten.  Maybe once every 6 months he passed a little bit of bright red blood per rectum.  Last time he did so was 6 months ago.  He had known gallstones.  He made an appointment with us to see about his bowels.  In the interim his PCP did refer him to have a cholecystectomy.  He states that went uneventful and since then he is done quite well.  His GI symptoms have subsided.  He states he had one episode of diarrhea since his gallbladder came out.  For the most part he is now having formed regular bowel movements every day.  Denies bloody mucus.  No abdominal bloating.  Does admit that he is eating healthier and less amounts.    He denies any upper intestinal symptoms.  He has had reflux but that is been controlled with reflux medications for about 3 years he states.  Denies dysphagia, heartburn or abdominal discomfort nausea or any other upper GI symptoms.      Past Medical History:   Diagnosis Date   • Bladder cancer (CMS/HCC) 1999    Recurrent, had bcq and removal of cancer   • Cholelithiasis    • Colon polyps    • Diabetes mellitus (CMS/HCC)    • Diverticulosis    • Hypercholesterolemia    • Hypertension    • Peripheral neuropathy    • Vascular abnormality     DX on plavix per pt history       Past Surgical History:   Procedure Laterality Date   • BACK SURGERY     • CHOLECYSTECTOMY     • COLONOSCOPY  06/2013    Recall 3 yr, 2 polyps adenomatous, diverticulosis   • COLONOSCOPY N/A 10/28/2016    Procedure: COLONOSCOPY WITH ANESTHESIA;  Surgeon: Guillermo Wong MD;   Location: [facility] ENDOSCOPY;  Service:    • LAMINECTOMY      Lumbar   • NECK SURGERY  2012         Current Outpatient Medications:   •  aspirin 81 MG EC tablet, Take 81 mg by mouth Daily., Disp: , Rfl:   •  Dapagliflozin Propanediol 10 MG tablet, Take 10 mg by mouth., Disp: , Rfl:   •  fenofibrate (TRICOR) 145 MG tablet, Take 145 mg by mouth Daily., Disp: , Rfl:   •  glimepiride (AMARYL) 4 MG tablet, Take 4 mg by mouth 2 (Two) Times a Day., Disp: , Rfl:   •  Liraglutide (VICTOZA) 18 MG/3ML solution pen-injector, Inject  under the skin., Disp: , Rfl:   •  metFORMIN (GLUCOPHAGE) 850 MG tablet, Take 850 mg by mouth 2 (Two) Times a Day., Disp: , Rfl:   •  pioglitazone (ACTOS) 15 MG tablet, Take 15 mg by mouth Daily., Disp: , Rfl:   •  pregabalin (LYRICA) 50 MG capsule, Take 50 mg by mouth 2 (Two) Times a Day., Disp: , Rfl:   •  Semaglutide (OZEMPIC, 1 MG/DOSE, SC), Inject  under the skin into the appropriate area as directed., Disp: , Rfl:   •  benazepril (LOTENSIN) 10 MG tablet, Take 10 mg by mouth Daily., Disp: , Rfl:   •  clopidogrel (PLAVIX) 75 MG tablet, Take 75 mg by mouth Daily., Disp: , Rfl:   •  Dulaglutide (TRULICITY) 0.75 MG/0.5ML solution pen-injector, Inject 75 mg under the skin 1 (One) Time Per Week., Disp: , Rfl:     No Known Allergies    Social History     Socioeconomic History   • Marital status: Single     Spouse name: Not on file   • Number of children: Not on file   • Years of education: Not on file   • Highest education level: Not on file   Tobacco Use   • Smoking status: Former Smoker     Quit date:      Years since quittin.4   • Smokeless tobacco: Never Used   Substance and Sexual Activity   • Alcohol use: Yes     Comment: Moderate   • Drug use: Yes   • Sexual activity: Defer       Family History   Problem Relation Age of Onset   • Colon cancer Neg Hx        Review of Systems  General no fever chills or sweats weight stable  Gastrointestinal: Not present-abdominal pain,  dysphagia,  hematemesis, melena,nausea, vomiting, pyrosis, regurgitation,     Objective     Vitals:    05/24/21 1510   BP: 124/66   Pulse: 96   Temp: 96.8 °F (36 °C)   SpO2: 98%       Physical Exam  No acute distress. Vital signs as documented.  Sclera anicteric.  Neck without noticeable JVD. Lungs clear to auscultation. Heart exam notable for regular rhythm, normal sounds. Abdomen is soft, nontender, non distended, normal bowel sounds and without evidence of organomegaly, masses.  Neuro alert, moves extremities.        Assessment/Plan   Problem List Items Addressed This Visit        Gastrointestinal Abdominal     Adenomatous polyp of colon    Overview     Colonoscopy November 2016.  Adenomatous polyp removed.  Surveillance colonoscopy recommended approximately November 2021         Relevant Orders    Case Request (Completed)    Change in bowel habits - Primary            Regarding his bowel habits it does sound like there is several things going on.  For the main part he is got better since his gallbladder removed.  As I discussed with him I still suspect he probably has a little bit of a food intolerance and his diabetic medication such as Metformin can make you more prone to diarrhea as well.  At this time since he is doing well I recommend a healthy diet.  I suggest small meals.  We talked about different things that can cause diarrhea such as dairy products and salads etc.    Because he did pass a little bit of bright red blood 6 months ago, because he did have a change in his bowel habits because his been over 4 and half years since his last colonoscopy, I recommend that we go ahead and pursue surveillance colonoscopy exam.  He is due for one this coming fall.  He is willing to pursue now.  The risk benefits and alternatives of colonoscopy were provided    Continue ongoing management by primary care provider and other specialists.     Body mass index is 32.41 kg/m².  Increased BMI, healthy diet discussed as above      EMR  Dragon/transcription disclaimer:  Much of this encounter note is electronic transcription/translation of spoken language to printed text.  The electronic translation of spoken language may be erroneous, or at times, nonsensical words or phrases may be inadvertently transcribed.  Although I have reviewed the note for such errors, some may still exist.    Guillermo Wong MD  16:01 CDT  05/24/21

## 2021-06-07 ENCOUNTER — HOSPITAL ENCOUNTER (OUTPATIENT)
Dept: CT IMAGING | Facility: HOSPITAL | Age: 66
Discharge: HOME | End: 2021-06-07

## 2021-06-07 ENCOUNTER — TRANSCRIBE ORDERS (OUTPATIENT)
Dept: ADMINISTRATIVE | Facility: HOSPITAL | Age: 66
End: 2021-06-07

## 2021-06-07 DIAGNOSIS — Z13.9 SCREENING FOR UNSPECIFIED CONDITION: ICD-10-CM

## 2021-06-07 DIAGNOSIS — Z13.9 SCREENING FOR UNSPECIFIED CONDITION: Primary | ICD-10-CM

## 2021-06-07 PROCEDURE — 71271 CT THORAX LUNG CANCER SCR C-: CPT

## 2021-06-21 ENCOUNTER — ANESTHESIA (OUTPATIENT)
Dept: GASTROENTEROLOGY | Facility: HOSPITAL | Age: 66
End: 2021-06-21

## 2021-06-21 ENCOUNTER — ANESTHESIA EVENT (OUTPATIENT)
Dept: GASTROENTEROLOGY | Facility: HOSPITAL | Age: 66
End: 2021-06-21

## 2021-06-21 ENCOUNTER — HOSPITAL ENCOUNTER (OUTPATIENT)
Facility: HOSPITAL | Age: 66
Setting detail: HOSPITAL OUTPATIENT SURGERY
Discharge: HOME | End: 2021-06-21
Attending: INTERNAL MEDICINE | Admitting: INTERNAL MEDICINE

## 2021-06-21 VITALS
WEIGHT: 234 LBS | BODY MASS INDEX: 31.69 KG/M2 | DIASTOLIC BLOOD PRESSURE: 77 MMHG | OXYGEN SATURATION: 95 % | HEIGHT: 72 IN | SYSTOLIC BLOOD PRESSURE: 114 MMHG | RESPIRATION RATE: 20 BRPM | TEMPERATURE: 97.1 F | HEART RATE: 87 BPM

## 2021-06-21 DIAGNOSIS — D12.6 ADENOMATOUS POLYP OF COLON, UNSPECIFIED PART OF COLON: ICD-10-CM

## 2021-06-21 LAB — GLUCOSE BLDC GLUCOMTR-MCNC: 148 MG/DL (ref 70–130)

## 2021-06-21 PROCEDURE — 45380 COLONOSCOPY AND BIOPSY: CPT | Performed by: INTERNAL MEDICINE

## 2021-06-21 PROCEDURE — 25010000002 PROPOFOL 10 MG/ML EMULSION: Performed by: NURSE ANESTHETIST, CERTIFIED REGISTERED

## 2021-06-21 PROCEDURE — 88305 TISSUE EXAM BY PATHOLOGIST: CPT | Performed by: INTERNAL MEDICINE

## 2021-06-21 PROCEDURE — 82962 GLUCOSE BLOOD TEST: CPT

## 2021-06-21 PROCEDURE — 45385 COLONOSCOPY W/LESION REMOVAL: CPT | Performed by: INTERNAL MEDICINE

## 2021-06-21 RX ORDER — SODIUM CHLORIDE 0.9 % (FLUSH) 0.9 %
10 SYRINGE (ML) INJECTION AS NEEDED
Status: DISCONTINUED | OUTPATIENT
Start: 2021-06-21 | End: 2021-06-21 | Stop reason: HOSPADM

## 2021-06-21 RX ORDER — SODIUM CHLORIDE 0.9 % (FLUSH) 0.9 %
10 SYRINGE (ML) INJECTION EVERY 12 HOURS SCHEDULED
Status: CANCELLED | OUTPATIENT
Start: 2021-06-21

## 2021-06-21 RX ORDER — ATORVASTATIN CALCIUM 10 MG/1
10 TABLET, FILM COATED ORAL DAILY
COMMUNITY

## 2021-06-21 RX ORDER — LIDOCAINE HYDROCHLORIDE 20 MG/ML
INJECTION, SOLUTION EPIDURAL; INFILTRATION; INTRACAUDAL; PERINEURAL AS NEEDED
Status: DISCONTINUED | OUTPATIENT
Start: 2021-06-21 | End: 2021-06-21 | Stop reason: SURG

## 2021-06-21 RX ORDER — MIDAZOLAM HYDROCHLORIDE 1 MG/ML
0.5 INJECTION INTRAMUSCULAR; INTRAVENOUS
Status: CANCELLED | OUTPATIENT
Start: 2021-06-21

## 2021-06-21 RX ORDER — SODIUM CHLORIDE 9 MG/ML
100 INJECTION, SOLUTION INTRAVENOUS CONTINUOUS
Status: DISCONTINUED | OUTPATIENT
Start: 2021-06-21 | End: 2021-06-21 | Stop reason: HOSPADM

## 2021-06-21 RX ORDER — ONDANSETRON 2 MG/ML
4 INJECTION INTRAMUSCULAR; INTRAVENOUS ONCE AS NEEDED
Status: DISCONTINUED | OUTPATIENT
Start: 2021-06-21 | End: 2021-06-21 | Stop reason: HOSPADM

## 2021-06-21 RX ORDER — PROPOFOL 10 MG/ML
VIAL (ML) INTRAVENOUS AS NEEDED
Status: DISCONTINUED | OUTPATIENT
Start: 2021-06-21 | End: 2021-06-21 | Stop reason: SURG

## 2021-06-21 RX ORDER — OMEPRAZOLE 20 MG/1
20 CAPSULE, DELAYED RELEASE ORAL DAILY
COMMUNITY

## 2021-06-21 RX ADMIN — PROPOFOL 200 MG: 10 INJECTION, EMULSION INTRAVENOUS at 10:51

## 2021-06-21 RX ADMIN — LIDOCAINE HYDROCHLORIDE 100 MG: 20 INJECTION, SOLUTION EPIDURAL; INFILTRATION; INTRACAUDAL; PERINEURAL at 10:50

## 2021-06-21 RX ADMIN — PROPOFOL 80 MG: 10 INJECTION, EMULSION INTRAVENOUS at 10:50

## 2021-06-21 RX ADMIN — SODIUM CHLORIDE 100 ML/HR: 9 INJECTION, SOLUTION INTRAVENOUS at 10:36

## 2021-06-21 NOTE — ANESTHESIA PREPROCEDURE EVALUATION
Anesthesia Evaluation     Patient summary reviewed   no history of anesthetic complications:  NPO Solid Status: > 6 hours  NPO Liquid Status: > 6 hours           Airway   Mallampati: II  TM distance: >3 FB  Neck ROM: full  No difficulty expected  Dental      Pulmonary    Cardiovascular   Exercise tolerance: good (4-7 METS)    (+) hypertension, hyperlipidemia,   (-) pacemaker, past MI, dysrhythmias, cardiac stents, CABG      Neuro/Psych  (+) numbness,     (-) seizures, CVA  GI/Hepatic/Renal/Endo    (+) obesity,   diabetes mellitus type 2 poorly controlled,     Musculoskeletal     Abdominal    Substance History      OB/GYN          Other      history of cancer                    Anesthesia Plan    ASA 2     MAC     intravenous induction     Anesthetic plan, all risks, benefits, and alternatives have been provided, discussed and informed consent has been obtained with: patient and spouse/significant other.

## 2021-06-21 NOTE — ANESTHESIA POSTPROCEDURE EVALUATION
"Patient: Paulo ROBISON    Procedure Summary     Date: 06/21/21 Room / Location: [facility] ENDOSCOPY 4 / [facility]    Anesthesia Start: 1046 Anesthesia Stop: 1125    Procedure: COLONOSCOPY WITH ANESTHESIA (N/A ) Diagnosis:       Adenomatous polyp of colon, unspecified part of colon      (Adenomatous polyp of colon, unspecified part of colon [D12.6])    Surgeons: Guillermo Wong MD Provider: Ramana Rivera CRNA    Anesthesia Type: MAC ASA Status: 2          Anesthesia Type: MAC    Vitals  Vitals Value Taken Time   /77 06/21/21 1130   Temp     Pulse 82 06/21/21 1134   Resp 17 06/21/21 1116   SpO2 97 % 06/21/21 1133   Vitals shown include unvalidated device data.        Post Anesthesia Care and Evaluation    Patient location during evaluation: PACU  Patient participation: complete - patient participated  Level of consciousness: awake and alert  Pain management: adequate  Airway patency: patent  Anesthetic complications: No anesthetic complications    Cardiovascular status: acceptable  Respiratory status: acceptable  Hydration status: acceptable    Comments: Blood pressure 125/75, pulse 95, temperature 97.1 °F (36.2 °C), temperature source Temporal, resp. rate 17, height 182.9 cm (72\"), weight 106 kg (234 lb), SpO2 94 %.    Pt discharged from PACU based on liss score >8      "

## 2021-06-22 LAB
CYTO UR: NORMAL
LAB AP CASE REPORT: NORMAL
PATH REPORT.FINAL DX SPEC: NORMAL
PATH REPORT.GROSS SPEC: NORMAL

## 2022-06-21 ENCOUNTER — HOSPITAL ENCOUNTER (OUTPATIENT)
Dept: CT IMAGING | Facility: HOSPITAL | Age: 67
Discharge: HOME | End: 2022-06-21
Admitting: INTERNAL MEDICINE

## 2022-06-21 ENCOUNTER — TRANSCRIBE ORDERS (OUTPATIENT)
Dept: ADMINISTRATIVE | Facility: HOSPITAL | Age: 67
End: 2022-06-21

## 2022-06-21 DIAGNOSIS — Z13.9 SCREENING FOR UNSPECIFIED CONDITION: ICD-10-CM

## 2022-06-21 DIAGNOSIS — Z13.9 SCREENING FOR UNSPECIFIED CONDITION: Primary | ICD-10-CM

## 2022-06-21 PROCEDURE — 71271 CT THORAX LUNG CANCER SCR C-: CPT

## 2024-05-23 ENCOUNTER — OFFICE VISIT (OUTPATIENT)
Dept: CARDIOLOGY | Facility: CLINIC | Age: 69
End: 2024-05-23
Payer: MEDICARE

## 2024-05-23 VITALS
OXYGEN SATURATION: 98 % | HEART RATE: 88 BPM | SYSTOLIC BLOOD PRESSURE: 130 MMHG | DIASTOLIC BLOOD PRESSURE: 80 MMHG | BODY MASS INDEX: 32.1 KG/M2 | HEIGHT: 72 IN | WEIGHT: 237 LBS

## 2024-05-23 DIAGNOSIS — E78.00 HYPERCHOLESTEROLEMIA: ICD-10-CM

## 2024-05-23 DIAGNOSIS — R06.09 DOE (DYSPNEA ON EXERTION): Primary | ICD-10-CM

## 2024-05-23 DIAGNOSIS — E11.9 TYPE 2 DIABETES MELLITUS WITHOUT COMPLICATION, WITHOUT LONG-TERM CURRENT USE OF INSULIN: ICD-10-CM

## 2024-05-23 DIAGNOSIS — I10 ESSENTIAL HYPERTENSION: ICD-10-CM

## 2024-05-23 DIAGNOSIS — R53.83 OTHER FATIGUE: ICD-10-CM

## 2024-05-23 RX ORDER — TAMSULOSIN HYDROCHLORIDE 0.4 MG/1
1 CAPSULE ORAL DAILY
COMMUNITY

## 2024-05-23 RX ORDER — MULTIPLE VITAMINS W/ MINERALS TAB 9MG-400MCG
1 TAB ORAL DAILY
COMMUNITY

## 2024-05-23 NOTE — PROGRESS NOTES
"    [facility] - CARDIOLOGY  New Patient Initial Outpatient Evaluation    Primary Care Physician: Te Rivera MD    Subjective     Chief Complaint: referral for exertional dyspnea and palpitations.    History of Present Illness    68-year-old male presents today as a referral from Dr. Rivera, with referral back and indicating reasons noted above.  The patient reports a sensation of heaviness in his head, likening this to a weight of approximately 100 pounds, which occurs every morning. He notes a history allergic rhinitis, and sinus issues. His maxillofacial CT scan on 05/09/2024 demonstrated normal sinuses. He questions if he requires consultation with an otolaryngologist or if his head heaviness could be related to a cardiac issue.     The patient was referred by Dr. Rivera on 03/28/2024, following a visit with Dr. Rivera on 03/26/2024. He followed up with Dr. Rivera on 05/06/2024, and his note mentions the patient's lack of energy, and this cardiology appointment.     The patient reports that he will \"feel [his] heart beat more\" in the quiet at night, though he denies that his heart will \"race.\" This sensation occurs when he is lying supine, particularly 1 on his left side.  He has no associated other symptoms at this time.  He denies that this symptom has changed.     The patient [place] the mall, and previously ambulated \"2 or 3 times around\" without difficulties. However, with his last walk, he noted mild difficulty ambulating because of the \"cloud\" that \"comes on,\" which causes him to stop to rest. He denies that the \"cloud\" is alleviated by rest. \"I thought well...get hot or get your sinus draining, all that good stuff, and it'll go away. It helps some to do that, but it doesn't do it all.\"  He attempted to alleviate his symptom with \"sinus draining,\" though this only offered partial relief. He has been experiencing his current symptoms for 2-3 months, and notes that typically, when he " "experiences sinus issues, his symptoms resolve in less time. Dr. Rivera prescribed steroids, and antibiotics, and the patient noted relief for approximately 2 days with steroids. He recently completed antibiotics. He denies taking regular, daily sinus medications.    The patient notes a lack of energy, which he reported to Dr. Rivera. He is active, and farms, though experiences easy fatigability with activity, which is not normal for him. He questions if this could be related to age.     The patient denies chest pressure or chest heaviness though does report intermittent tightness in his upper chest, and bilateral shoulders which he is experincing currently. Movement alleviates the tightness, which he attributes to muscle tension.     He denies exertional dyspnea with mall walks or other activities, but notes he \"wears out easily.\" He completes annual low-dose chest CT scan for lung cancer screening, most recently on 06/26/2023. The radiologist's report of his chest CT scan is unavailable, though the patient reports a possible \"clogged up\" \"number 7\" artery on one of his CT scans. He denies ever completing a stress test. He believes he could probably ambulate on a treadmill, and has ambulated on a treadmill in the past.    The patient takes medication for diabetes mellitus, hypertension, and hyperlipidemia, and indicates his cholesterol level is well controlled. He takes aspirin.    The patient is a former smoker, and quit approximately 20 years ago. He consumes a small amount of beer.    Review of Systems   Constitutional:        Positive for easy fatigue.   HENT:  Positive for congestion.         Positive for head fullness.   Cardiovascular:  Positive for palpitations. Negative for chest pain, claudication, dyspnea on exertion, leg swelling, near-syncope, orthopnea, paroxysmal nocturnal dyspnea and syncope.   Respiratory:  Negative for shortness of breath.    Hematologic/Lymphatic: Does not bruise/bleed " easily.   Musculoskeletal:  Positive for myalgias.        Otherwise complete ROS reviewed and negative except as mentioned in the HPI.      Past Medical History:   Past Medical History:   Diagnosis Date    Bladder cancer 1999    Recurrent, had bcq and removal of cancer    Cholelithiasis     Colon polyps     Diabetes mellitus     Diverticulosis     Hypercholesterolemia     Hypertension     Peripheral neuropathy     Vascular abnormality     DX on plavix per pt history       Past Surgical History:  Past Surgical History:   Procedure Laterality Date    BACK SURGERY      CHOLECYSTECTOMY      COLONOSCOPY  06/2013    Recall 3 yr, 2 polyps adenomatous, diverticulosis    COLONOSCOPY N/A 10/28/2016    Procedure: COLONOSCOPY WITH ANESTHESIA;  Surgeon: Guillermo Wong MD;  Location:  [facility];  Service:     COLONOSCOPY N/A 06/21/2021    Procedure: COLONOSCOPY WITH ANESTHESIA;  Surgeon: Guillermo Wong MD;  Location:  [facility] ENDOSCOPY;  Service: Gastroenterology;  Laterality: N/A;  pre op: hx polyp  post op:diverticulosis, polyps  PCP: Te Rivera MD    LAMINECTOMY      Lumbar    NECK SURGERY  02/2012       Family History: family history includes No Known Problems in his father and mother.    Social History:  reports that he quit smoking about 25 years ago. His smoking use included cigarettes. He has never used smokeless tobacco. He reports current alcohol use of about 14.0 standard drinks of alcohol per week. He reports that he does not use drugs.    Medications:  Prior to Admission medications    Medication Sig Start Date End Date Taking? Authorizing Provider   aspirin 81 MG EC tablet Take 1 tablet by mouth Daily.   Yes Provider, MD Khushbu   atorvastatin (LIPITOR) 10 MG tablet Take 1 tablet by mouth Daily.   Yes Provider, MD Khushbu   benazepril (LOTENSIN) 10 MG tablet Take 1 tablet by mouth Daily.   Yes Emergency, Nurse Kelsie, RN   empagliflozin (Jardiance) 25 MG tablet tablet Take  by mouth  "Daily.   Yes Khushbu Fernandez MD   fenofibrate (TRICOR) 145 MG tablet Take 1 tablet by mouth Daily.   Yes Khushbu Fernandez MD   glimepiride (AMARYL) 4 MG tablet Take 1 tablet by mouth 2 (Two) Times a Day.   Yes Khushbu Fernandez MD   metFORMIN (GLUCOPHAGE) 850 MG tablet Take 1 tablet by mouth 2 (Two) Times a Day.   Yes Khushbu Fernandez MD   multivitamin with minerals tablet tablet Take 1 tablet by mouth Daily.   Yes Khushbu Fernandez MD   omeprazole (priLOSEC) 20 MG capsule Take 1 capsule by mouth Daily.   Yes Khushbu Fernandez MD   pioglitazone (ACTOS) 15 MG tablet Take 1 tablet by mouth Daily.   Yes Emergency, Nurse ROSA ELENA Iglesias   tamsulosin (FLOMAX) 0.4 MG capsule 24 hr capsule Take 1 capsule by mouth Daily.   Yes Khushbu Fernandez MD   Dapagliflozin Propanediol 10 MG tablet Take 10 mg by mouth.  5/23/24  Khushbu Fernandez MD   pregabalin (LYRICA) 50 MG capsule Take 1 capsule by mouth 2 (Two) Times a Day.  5/23/24  Emergency, Nurse Epic, RN   Semaglutide (OZEMPIC, 1 MG/DOSE, SC) Inject  under the skin into the appropriate area as directed.  5/23/24  Khushbu Fernandez MD     Allergies:  No Known Allergies    Objective     Vital Signs: /80   Pulse 88   Ht 182.9 cm (72\")   Wt 108 kg (237 lb)   SpO2 98%   BMI 32.14 kg/m²     Vitals and nursing note reviewed.   Constitutional:       General: Not in acute distress.     Appearance: Not in distress.   Neck:      Vascular: No JVD or JVR. JVD normal.   Pulmonary:      Effort: Pulmonary effort is normal.      Breath sounds: Normal breath sounds.   Cardiovascular:      Normal rate. Regular rhythm.      Murmurs: There is no murmur.      No gallop.  No rub.   Pulses:     Intact distal pulses.   Edema:     Peripheral edema absent.   Skin:     General: Skin is warm and dry.   Neurological:      Mental Status: Alert, oriented to person, place, and time and oriented to person, place and time.         Results Reviewed:      ECG 12 " Lead    Date/Time: 5/23/2024 10:21 AM  Performed by: Williams Yepez MD    Authorized by: Williams Yepez MD  Previous ECG: no previous ECG available  Rhythm: sinus rhythm  BPM: 88  QRS axis: left  Other findings: poor R wave progression  Clinical impression comment: borderline ECG        Independent review of imaging, my interpretation: I reviewed the axial slices from his noncontrast CT scan of the chest, noting dense calcification of the left coronary system  Assessment / Plan        Problem List Items Addressed This Visit (all established and stable, except for otherwise noted)         Cardiac and Vasculature    Hypercholesterolemia    Essential hypertension       Endocrine and Metabolic    Type 2 diabetes mellitus without complication, without long-term current use of insulin    Relevant Medications    empagliflozin (Jardiance) 25 MG tablet tablet    Other Relevant Orders    Adult Stress Echo W/ Cont or Stress Agent if Necessary Per Protocol     Other Visit Diagnoses       SCHROEDER (dyspnea on exertion)    -  Primary: Requires further workup    Relevant Orders    Adult Stress Echo W/ Cont or Stress Agent if Necessary Per Protocol    Other fatigue: Requires further cardiac workup       Relevant Orders    Adult Stress Echo W/ Cont or Stress Agent if Necessary Per Protocol            Recommendations and plans:    Mr. Barnes's primary complaint and concern is of head fullness and a congestion that only temporarily responded to some steroids and antibiotics. I do not think these symptoms are cardiac in nature.  However, he also admits to some reduced stamina and easier fatigability, and he has a high-risk profile for having developed coronary artery disease, given his diabetes, hypertension, age, male, and former smoking. I also did review images myself independently that are in the system of CT scans that are done on an annual basis for screening, given his prior smoking, and work exposure. Though I cannot view a  radiology report to this, I do acknowledge that there is diffuse calcification in his coronaries.     As such, I would like to risk stratify him further with an exercise stress echocardiogram that can be converted to dobutamine if heart rate cannot reach a target level. Otherwise, I commended him, and Dr. Rivera for the medical regimen he is currently on in terms of its preventative value regarding reducing ischemic events.    Otherwise, no changes recommended today, and we will follow up with him on the basis of the stress test results.     In the meantime, I did encourage him to discuss further with Dr. Rivera regarding potential other treatments, referrals, or testing for his primary complaint, which is that of head fullness.    Transcribed from ambient dictation for Williams Yepez MD by Camilla Casey.  05/23/24   15:31 CDT    Patient or patient representative verbalized consent to the visit recording.    I Williams Yepez MD have personally performed the services described in this document as scribed by the above individual, and it is both accurate and complete.   I have edited each component as needed.    Williams Yepez MD  5/23/2024  23:36 CDT

## 2024-06-04 ENCOUNTER — HOSPITAL ENCOUNTER (OUTPATIENT)
Dept: CARDIOLOGY | Facility: HOSPITAL | Age: 69
Discharge: HOME | End: 2024-06-04
Admitting: INTERNAL MEDICINE
Payer: MEDICARE

## 2024-06-04 VITALS
SYSTOLIC BLOOD PRESSURE: 129 MMHG | DIASTOLIC BLOOD PRESSURE: 65 MMHG | BODY MASS INDEX: 32.51 KG/M2 | HEART RATE: 77 BPM | HEIGHT: 72 IN | WEIGHT: 240 LBS

## 2024-06-04 DIAGNOSIS — E11.9 TYPE 2 DIABETES MELLITUS WITHOUT COMPLICATION, WITHOUT LONG-TERM CURRENT USE OF INSULIN: ICD-10-CM

## 2024-06-04 DIAGNOSIS — R53.83 OTHER FATIGUE: ICD-10-CM

## 2024-06-04 DIAGNOSIS — R06.09 DOE (DYSPNEA ON EXERTION): ICD-10-CM

## 2024-06-04 PROCEDURE — 25510000001 PERFLUTREN 6.52 MG/ML SUSPENSION: Performed by: INTERNAL MEDICINE

## 2024-06-04 PROCEDURE — 93017 CV STRESS TEST TRACING ONLY: CPT

## 2024-06-04 PROCEDURE — 93350 STRESS TTE ONLY: CPT

## 2024-06-04 RX ADMIN — PERFLUTREN 8.48 MG: 6.52 INJECTION, SUSPENSION INTRAVENOUS at 09:46

## 2024-06-06 LAB
BH CV STRESS BP STAGE 1: NORMAL
BH CV STRESS DURATION MIN STAGE 1: 3
BH CV STRESS DURATION SEC STAGE 1: 0
BH CV STRESS GRADE STAGE 1: 10
BH CV STRESS HR STAGE 1: 126
BH CV STRESS METS STAGE 1: 5
BH CV STRESS PROTOCOL 1: NORMAL
BH CV STRESS RECOVERY BP: NORMAL MMHG
BH CV STRESS RECOVERY HR: 87 BPM
BH CV STRESS SPEED STAGE 1: 1.7
BH CV STRESS STAGE 1: 1
MAXIMAL PREDICTED HEART RATE: 150 BPM
PERCENT MAX PREDICTED HR: 84 %
STRESS BASELINE BP: NORMAL MMHG
STRESS BASELINE HR: 78 BPM
STRESS PERCENT HR: 99 %
STRESS POST ESTIMATED WORKLOAD: 5 METS
STRESS POST EXERCISE DUR MIN: 4 MIN
STRESS POST EXERCISE DUR SEC: 36 SEC
STRESS POST PEAK BP: NORMAL MMHG
STRESS POST PEAK HR: 126 BPM
STRESS TARGET HR: 128 BPM

## 2024-06-14 ENCOUNTER — HOSPITAL ENCOUNTER (OUTPATIENT)
Dept: CT IMAGING | Facility: HOSPITAL | Age: 69
Discharge: HOME | End: 2024-06-14

## 2024-06-14 ENCOUNTER — TRANSCRIBE ORDERS (OUTPATIENT)
Dept: ADMINISTRATIVE | Facility: HOSPITAL | Age: 69
End: 2024-06-14
Payer: MEDICARE

## 2024-06-14 DIAGNOSIS — Z13.9 SCREENING FOR UNSPECIFIED CONDITION: ICD-10-CM

## 2024-06-14 DIAGNOSIS — Z13.9 SCREENING FOR UNSPECIFIED CONDITION: Primary | ICD-10-CM

## 2024-06-14 PROCEDURE — 71271 CT THORAX LUNG CANCER SCR C-: CPT

## 2024-07-03 ENCOUNTER — OFFICE VISIT (OUTPATIENT)
Dept: OTOLARYNGOLOGY | Facility: CLINIC | Age: 69
End: 2024-07-03
Payer: MEDICARE

## 2024-07-03 VITALS
WEIGHT: 231.4 LBS | HEIGHT: 72 IN | BODY MASS INDEX: 31.34 KG/M2 | TEMPERATURE: 98.6 F | SYSTOLIC BLOOD PRESSURE: 132 MMHG | DIASTOLIC BLOOD PRESSURE: 62 MMHG | HEART RATE: 94 BPM

## 2024-07-03 DIAGNOSIS — H61.23 IMPACTED CERUMEN, BILATERAL: ICD-10-CM

## 2024-07-03 DIAGNOSIS — J32.9 CHRONIC SINUSITIS, UNSPECIFIED LOCATION: ICD-10-CM

## 2024-07-03 DIAGNOSIS — J30.9 ALLERGIC RHINITIS, UNSPECIFIED SEASONALITY, UNSPECIFIED TRIGGER: ICD-10-CM

## 2024-07-03 DIAGNOSIS — J34.3 HYPERTROPHY OF INFERIOR NASAL TURBINATE: ICD-10-CM

## 2024-07-03 DIAGNOSIS — J34.89 CONCHA BULLOSA: Primary | ICD-10-CM

## 2024-07-03 RX ORDER — AZELASTINE 1 MG/ML
2 SPRAY, METERED NASAL 2 TIMES DAILY
Qty: 30 ML | Refills: 11 | Status: SHIPPED | OUTPATIENT
Start: 2024-07-03

## 2024-07-03 RX ORDER — TIRZEPATIDE 5 MG/.5ML
5 INJECTION, SOLUTION SUBCUTANEOUS WEEKLY
COMMUNITY
Start: 2024-07-02

## 2024-07-03 RX ORDER — FLUTICASONE PROPIONATE 50 MCG
2 SPRAY, SUSPENSION (ML) NASAL DAILY
Qty: 16 G | Refills: 11 | Status: SHIPPED | OUTPATIENT
Start: 2024-07-03

## 2024-07-03 NOTE — PROGRESS NOTES
KAMRYN Adams  [facility] EAR [facility]SE & THROAT  [address], [address]  [place] [address]  Fax [phone]  Phone [phone]      Visit Type: NEW PATIENT   Chief Complaint   Patient presents with    Sinus Problem     Chronic sinusitis, unspecified CT @ [facility] [place] on 05/20/24- FEELING CLOGGED IN SINUSES AND EARS           HPI  He complains of ear fullness, ear pressure, decreased hearing, and muffled hearing. The symptoms are localized to both ears. The patient has had mild to moderate symptoms. The symptoms have been relatively constant for the last several months. There have been no identified factors that aggravate the symptoms.     He denies nasal congestion, obstruction, drainage, or facial pressure.    Patient denies any nasal sprays, antibiotics, or steroids as prior treatment. He has not had allergy testing in the past. He has not had sinus surgery.     He reports intermittent lightheadedness, he denies rotational vertigo.  He reports it is brought on by standing up too fast. The episodes last about 5 minutes.     Past Medical History:   Diagnosis Date    Bladder cancer 1999    Recurrent, had bcq and removal of cancer    Cholelithiasis     Colon polyps     Diabetes mellitus     Diverticulosis     Hypercholesterolemia     Hypertension     Peripheral neuropathy     Vascular abnormality     DX on plavix per pt history       Past Surgical History:   Procedure Laterality Date    BACK SURGERY      CHOLECYSTECTOMY      COLONOSCOPY  06/2013    Recall 3 yr, 2 polyps adenomatous, diverticulosis    COLONOSCOPY N/A 10/28/2016    Procedure: COLONOSCOPY WITH ANESTHESIA;  Surgeon: Guillermo Wong MD;  Location: [facility] ENDOSCOPY;  Service:     COLONOSCOPY N/A 06/21/2021    Procedure: COLONOSCOPY WITH ANESTHESIA;  Surgeon: Guillermo Wong MD;  Location: [facility] ENDOSCOPY;  Service: Gastroenterology;  Laterality: N/A;  pre op: hx polyp  post  op:diverticulosis, polyps  PCP: Te Rivera MD    LAMINECTOMY      Lumbar    NECK SURGERY  02/2012       Family History: His family history includes No Known Problems in his father and mother.     Social History: He  reports that he quit smoking about 25 years ago. His smoking use included cigarettes. He has never used smokeless tobacco. He reports current alcohol use of about 14.0 standard drinks of alcohol per week. He reports that he does not use drugs.    Home Medications:  Tirzepatide, aspirin, atorvastatin, azelastine, benazepril, empagliflozin, fenofibrate, fluticasone, glimepiride, metFORMIN, multivitamin with minerals, omeprazole, pioglitazone, and tamsulosin    Allergies:  He has No Known Allergies.       Vital Signs:   Temp:  [98.6 °F (37 °C)] 98.6 °F (37 °C)  Heart Rate:  [94] 94  BP: (132)/(62) 132/62  ENT Physical Exam  Constitutional  Appearance: patient appears well-developed, well-nourished and well-groomed,  Communication/Voice: communication appropriate for developmental age; vocal quality normal;  Head and Face  Appearance: head appears normal, face appears normal and face appears atraumatic;  Palpation: facial palpation normal;  Salivary: glands normal;  Ear  Hearing: impaired to conversational voice;  Auricles: bilateral auricles normal;  Ear Canals: bilateral ear canals impacted cerumen observed;  Nose  Internal Nose: bilateral intranasal mucosa edematous;  Oral Cavity/Oropharynx  Lips: normal;  Neck  Neck: neck normal;  Respiratory  Inspection: breathing unlabored;  Cardiovascular  Inspection: extremities are warm and well perfused;  Lymphatic  Palpation: lymph nodes normal;       Ear Cerumen Removal    Date/Time: 7/3/2024 10:50 AM    Performed by: Madison Dove APRN  Authorized by: Madison Dove APRN  Consent: Verbal consent obtained.  Consent given by: patient    Anesthesia:  Local Anesthetic: none  Location details: left ear and right ear  Patient tolerance:  patient tolerated the procedure well with no immediate complications  Comments: TM intact bilaterally  Procedure type: instrumentation, curette, alligator forceps     Tympanometry    Date/Time: 7/3/2024 10:54 AM    Performed by: Madison Dove APRN  Authorized by: Madison Dove APRN  Consent: Verbal consent obtained.  Consent given by: patient  Patient tolerance: patient tolerated the procedure well with no immediate complications  Comments: Type A bilaterally         Result Review       RESULTS REVIEW    I have reviewed the patients old records in the chart.        Assessment & Plan  Rere bullosa    Chronic sinusitis, unspecified location    Allergic rhinitis, unspecified seasonality, unspecified trigger    Hypertrophy of inferior nasal turbinate    Impacted cerumen, bilateral       Orders Placed This Encounter   Procedures    Ear Cerumen Removal    Tympanometry      New Medications Ordered This Visit   Medications    fluticasone (FLONASE) 50 MCG/ACT nasal spray     Si sprays into the nostril(s) as directed by provider Daily.     Dispense:  16 g     Refill:  11    azelastine (ASTELIN) 0.1 % nasal spray     Si sprays into the nostril(s) as directed by provider 2 (Two) Times a Day. Use in each nostril as directed     Dispense:  30 mL     Refill:  11     Call for ear problems, especially change of hearing, ear pain or dizziness.  For the best results, use nasal sprays every day. It may take a week to build up in the nasal lining and a few more weeks to improve the eustachian tube function.  Return in about 6 weeks (around 2024) for Follow up with KAMRYN Adams.        Electronically signed by KAMRYN Adams 24 10:55 AM CDT.

## 2024-08-05 ENCOUNTER — OFFICE VISIT (OUTPATIENT)
Dept: OTOLARYNGOLOGY | Facility: CLINIC | Age: 69
End: 2024-08-05
Payer: MEDICARE

## 2024-08-05 VITALS
DIASTOLIC BLOOD PRESSURE: 50 MMHG | SYSTOLIC BLOOD PRESSURE: 102 MMHG | HEIGHT: 72 IN | WEIGHT: 220 LBS | TEMPERATURE: 98.6 F | BODY MASS INDEX: 29.8 KG/M2 | HEART RATE: 96 BPM

## 2024-08-05 DIAGNOSIS — R26.89 IMBALANCE: Primary | ICD-10-CM

## 2024-08-05 PROCEDURE — 1160F RVW MEDS BY RX/DR IN RCRD: CPT | Performed by: EMERGENCY MEDICINE

## 2024-08-05 PROCEDURE — 3074F SYST BP LT 130 MM HG: CPT | Performed by: EMERGENCY MEDICINE

## 2024-08-05 PROCEDURE — 1159F MED LIST DOCD IN RCRD: CPT | Performed by: EMERGENCY MEDICINE

## 2024-08-05 PROCEDURE — 99213 OFFICE O/P EST LOW 20 MIN: CPT | Performed by: EMERGENCY MEDICINE

## 2024-08-05 PROCEDURE — 3078F DIAST BP <80 MM HG: CPT | Performed by: EMERGENCY MEDICINE

## 2024-08-05 RX ORDER — PIOGLITAZONEHYDROCHLORIDE 30 MG/1
30 TABLET ORAL DAILY
COMMUNITY
Start: 2024-08-02 | End: 2024-08-05

## 2024-08-05 NOTE — PROGRESS NOTES
KAMRYN Adams  [facility] EAR NOSE & THROAT  [address], [address]  [facility] [address]  Fax [phone]  Phone [phone]      Visit Type: FOLLOW UP   Chief Complaint   Patient presents with    Ear Problem     6 wk f/u sinus meds wasn't helpful balance, fluid felt in head, wax build up, blurred vision dizziness, brain fog. Worse in heat           HPI  He presents for a follow up evaluation. He has had continued problems with imbalance, brain fog, blurred vision, and feeling of fluid in the ear.  We started him on flonase and astelin at last visit, he has had no change in symptoms.         Past Medical History:   Diagnosis Date    Bladder cancer 1999    Recurrent, had bcq and removal of cancer    Cholelithiasis     Colon polyps     Diabetes mellitus     Diverticulosis     Hypercholesterolemia     Hypertension     Peripheral neuropathy     Vascular abnormality     DX on plavix per pt history       Past Surgical History:   Procedure Laterality Date    BACK SURGERY      CHOLECYSTECTOMY      COLONOSCOPY  06/2013    Recall 3 yr, 2 polyps adenomatous, diverticulosis    COLONOSCOPY N/A 10/28/2016    Procedure: COLONOSCOPY WITH ANESTHESIA;  Surgeon: Guillermo Wong MD;  Location: [facility] ENDOSCOPY;  Service:     COLONOSCOPY N/A 06/21/2021    Procedure: COLONOSCOPY WITH ANESTHESIA;  Surgeon: Guillermo Wong MD;  Location: [facility] ENDOSCOPY;  Service: Gastroenterology;  Laterality: N/A;  pre op: hx polyp  post op:diverticulosis, polyps  PCP: Te Rivera MD    LAMINECTOMY      Lumbar    NECK SURGERY  02/2012       Family History: His family history includes No Known Problems in his father and mother.     Social History: He  reports that he quit smoking about 25 years ago. His smoking use included cigarettes. He has never used smokeless tobacco. He reports current alcohol use of about 14.0 standard drinks of alcohol per week. He reports  that he does not use drugs.    Home Medications:  Tirzepatide, aspirin, atorvastatin, azelastine, benazepril, empagliflozin, fenofibrate, fluticasone, glimepiride, metFORMIN, multivitamin with minerals, omeprazole, and tamsulosin    Allergies:  He has No Known Allergies.       Vital Signs:   Temp:  [98.6 °F (37 °C)] 98.6 °F (37 °C)  Heart Rate:  [96] 96  BP: (102)/(50) 102/50  ENT Physical Exam  Constitutional  Appearance: patient appears well-developed, well-nourished and well-groomed,  Communication/Voice: communication appropriate for developmental age; vocal quality normal;  Head and Face  Appearance: head appears normal, face appears normal and face appears atraumatic;  Palpation: facial palpation normal;  Salivary: glands normal;  Ear  Hearing: intact;  Auricles: right auricle normal; left auricle normal;  External Mastoids: right external mastoid normal; left external mastoid normal;  Ear Canals: right ear canal normal; left ear canal normal;  Tympanic Membranes: right tympanic membrane normal; left tympanic membrane normal;  Nose  External Nose: nares patent bilaterally; external nose normal;  Oral Cavity/Oropharynx  Lips: normal;  Teeth: normal;  Gums: gingiva normal;  Tongue: normal;  Oral mucosa: normal;  Hard palate: normal;  Neck  Neck: neck normal; neck palpation normal;  Respiratory  Inspection: breathing unlabored;  Cardiovascular  Inspection: extremities are warm and well perfused;  Lymphatic  Palpation: lymph nodes normal;  Neurovestibular  Neurological comments: He has a rhythmic tremor, involves right shoulder and neck           Result Review       RESULTS REVIEW    I have reviewed the patients old records in the chart.        Assessment & Plan  Imbalance       Orders Placed This Encounter   Procedures    MRI internal auditory canal w wo    MRI Brain With & Without Contrast    Ambulatory Referral to Physical Therapy         Follow a low salt diet to try to prevent inner ear fluid  accumulation.  Decrease caffeine, avoid red wine, nitrites in cured meats, food additives (like monosodium glutamate/ MSG) and dyes.  Return in about 8 weeks (around 9/30/2024) for Follow up with KAMRYN Adams.        Electronically signed by KAMRYN Adams 08/05/24 10:09 AM CDT.

## 2024-08-06 ENCOUNTER — TELEPHONE (OUTPATIENT)
Dept: OTOLARYNGOLOGY | Facility: CLINIC | Age: 69
End: 2024-08-06
Payer: MEDICARE

## 2024-08-06 NOTE — TELEPHONE ENCOUNTER
Called pt back told him to have a MRI of the neck he would need to consult his PCP for an order for that since we don't see PT for neck pain. Also told him that when they call to schedule the MRI he can request to have it done at [facility] lab and imaging.

## 2024-08-23 ENCOUNTER — TELEPHONE (OUTPATIENT)
Dept: OTOLARYNGOLOGY | Facility: CLINIC | Age: 69
End: 2024-08-23

## 2024-08-30 ENCOUNTER — HOSPITAL ENCOUNTER (OUTPATIENT)
Dept: MRI IMAGING | Facility: HOSPITAL | Age: 69
Discharge: HOME | End: 2024-08-30
Payer: MEDICARE

## 2024-08-30 DIAGNOSIS — R26.89 IMBALANCE: ICD-10-CM

## 2024-08-30 LAB — CREAT BLDA-MCNC: 1.2 MG/DL (ref 0.6–1.3)

## 2024-08-30 PROCEDURE — 0 GADOBENATE DIMEGLUMINE 529 MG/ML SOLUTION: Performed by: EMERGENCY MEDICINE

## 2024-08-30 PROCEDURE — 70553 MRI BRAIN STEM W/O & W/DYE: CPT

## 2024-08-30 PROCEDURE — 82565 ASSAY OF CREATININE: CPT

## 2024-08-30 PROCEDURE — A9577 INJ MULTIHANCE: HCPCS | Performed by: EMERGENCY MEDICINE

## 2024-08-30 RX ADMIN — GADOBENATE DIMEGLUMINE 20 ML: 529 INJECTION, SOLUTION INTRAVENOUS at 15:08

## 2024-09-16 ENCOUNTER — TELEPHONE (OUTPATIENT)
Dept: OTOLARYNGOLOGY | Facility: CLINIC | Age: 69
End: 2024-09-16

## 2024-09-16 NOTE — TELEPHONE ENCOUNTER
[facility] received a fax that requires your attention. The document has been indexed to the patient’s chart for your review.      Reason for sending: PLEASE REVIEW PT D/C SUMMARY    Documents Description: D/C SUMMARY-[facility]-09.11.24    Name of Sender:  [facility]    Date Indexed: 9.16.24    Notes (if needed):

## 2024-11-22 ENCOUNTER — OFFICE VISIT (OUTPATIENT)
Dept: NEUROLOGY | Age: 69
End: 2024-11-22

## 2024-11-22 VITALS
BODY MASS INDEX: 32.64 KG/M2 | SYSTOLIC BLOOD PRESSURE: 115 MMHG | OXYGEN SATURATION: 97 % | HEART RATE: 87 BPM | HEIGHT: 72 IN | WEIGHT: 241 LBS | DIASTOLIC BLOOD PRESSURE: 67 MMHG

## 2024-11-22 DIAGNOSIS — R41.3 MEMORY CHANGES: Primary | ICD-10-CM

## 2024-11-22 DIAGNOSIS — R25.1 TREMOR: ICD-10-CM

## 2024-11-22 DIAGNOSIS — R41.89 BRAIN FOG: ICD-10-CM

## 2024-11-22 DIAGNOSIS — R41.3 MEMORY CHANGES: ICD-10-CM

## 2024-11-22 LAB
CRP SERPL HS-MCNC: <0.3 MG/DL (ref 0–0.5)
TSH SERPL DL<=0.005 MIU/L-ACNC: 1.29 UIU/ML (ref 0.27–4.2)
VIT B12 SERPL-MCNC: 324 PG/ML (ref 232–1245)

## 2024-11-22 RX ORDER — TAMSULOSIN HYDROCHLORIDE 0.4 MG/1
0.4 CAPSULE ORAL DAILY
COMMUNITY

## 2024-11-22 RX ORDER — CYCLOBENZAPRINE HCL 10 MG
10 TABLET ORAL 3 TIMES DAILY PRN
COMMUNITY

## 2024-11-22 RX ORDER — TIRZEPATIDE 2.5 MG/.5ML
INJECTION, SOLUTION SUBCUTANEOUS
COMMUNITY

## 2024-11-22 NOTE — PROGRESS NOTES
[facility] Office Note      Patient:   Matt Hernandez  MR#:    [id number]  Account Number:                         YOB: 1955  Date of Evaluation:  11/22/2024  Time of Note:                          1:21 PM  Primary/Referring Physician:  Alex Holguin MD   Consulting Physician:  SHAHNAZ Gee    NEW PATIENT CONSULTATION      Chief Complaint   Patient presents with    New Patient    Memory Loss     C/O fuzzy feeling and remembering certain things. States he at times has slow speech and blurred vision.     Dizziness     C/O dizziness that comes and goes. Has fallen in the past. Last fall ~6 months ago.        HISTORY OF PRESENT ILLNESS    Matt Hernandez is a 69 y.o. year old male here for evaluation and treatment of multiple complaints including memory changes, bradykinesia, metal taste, imbalance, blurred vision, slow speech, neck and muscle pain, stiffness to shoulders, weakness, dizziness.  Referral is for memory loss.  Here with wife.  He denies clear memory loss but notes he is having brain fog that is waxing and waning. Denies daily memory loss. Short term and long term memory intact. Sometimes there are some word finding issues. Reports that brain fog can last for hours, still able to talk and function. States his brain feels \"lazy.\" No syncope. They have checked blood sugar at this time and it is normal. He gets good sleep at night.  Denies changes to mood or behavior that is atypical for him. He does have some mild tremor, unclear which side. Notes he has also started chewing on his lips. Metal taste to mouth has resolved.  Dizziness has resolved.  Did complete vestibular therapy with unclear benefit.  There is also some waxing and waning blurred vision.  Wears glasses, has been to ophthalmologist with no significant eye abnormalities.  He reports he drinks 3 beers nightly. He does have some burning foot pain, known underlying peripheral neuropathy. He denies history of head

## 2024-11-23 LAB — ERYTHROCYTE [SEDIMENTATION RATE] IN BLOOD BY WESTERGREN METHOD: 3 MM/HR (ref 0–15)

## 2024-11-25 ENCOUNTER — TELEPHONE (OUTPATIENT)
Dept: NEUROLOGY | Age: 69
End: 2024-11-25

## 2024-11-25 LAB
ARSENIC BLD-MCNC: <10 UG/L
B BURGDOR IGG SER QL IB: NEGATIVE
B BURGDOR IGM SER QL IB: NEGATIVE
CADMIUM BLD-MCNC: <1 UG/L
COPPER SERPL-MCNC: 79.6 UG/DL (ref 70–140)
LEAD BLD-MCNC: <2 UG/DL
MERCURY BLD-MCNC: <2.5 UG/L
NUCLEAR IGG SER QL IA: NORMAL
RPR SER QL: NORMAL

## 2024-11-25 NOTE — TELEPHONE ENCOUNTER
----- Message from SHAHNAZ Gee CNP sent at 11/25/2024  8:04 AM CST -----  Labs normal; B12 on the low side of normal. Start OTC supplementation.

## 2024-11-26 LAB
A-TOCOPHEROL VIT E SERPL-MCNC: 10.2 MG/L (ref 5.5–18)
BETA+GAMMA TOCOPHEROL SERPL-MCNC: 0.5 MG/L (ref 0–6)
CERULOPLASMIN SERPL-MCNC: 19 MG/DL (ref 15–30)
Lab: NORMAL
REPORT: NORMAL
THIS TEST SENT TO: NORMAL
VIT B1 BLD-MCNC: 160 NMOL/L (ref 70–180)

## 2024-12-09 ENCOUNTER — HOSPITAL ENCOUNTER (OUTPATIENT)
Dept: NEUROLOGY | Age: 69
Discharge: HOME | End: 2024-12-09
Payer: MEDICARE

## 2024-12-09 PROCEDURE — 95813 EEG EXTND MNTR 61-119 MIN: CPT | Performed by: PSYCHIATRY & NEUROLOGY

## 2024-12-09 PROCEDURE — 95813 EEG EXTND MNTR 61-119 MIN: CPT

## 2024-12-11 NOTE — PROCEDURES
"Pulmonary Function Test Results (PFT)    Spirometry Actual Predicted % Predicted   FVC (L) 3.82 4.21 90   FEV1 ((L) 3.66 3.65 100   FEV1/FVC (%) 96 87 110   FEF 25-75% (L/sec) 5.78 4.06 142     Please see  PFT in \"Media Tab\" of Notes activity  (EMR)    Provider Interpretation: normal    Respiratory -  Cystic Fibrosis Airway Clearance Therapy (ACT)      Rylee seen today at [facility] with mother. Testing today included PFT and Throat culture. Rylee and Mom had no questions or concerns for RT this visit regarding resp Tx/equipment. Current plan for Respiratory medications and ACT is:     AM  Albuterol   Hypertonic Saline   ACT: Vest ([facility])     PM  Albuterol  Hypertonic Saline  Pulmozyme  ACT: Vest ([facility])     Exercise:As tolerated  " ADULT OUTPATIENT ELECTROENCEPHALOGRAM REPORT    Patient:   Matt Hernandez  MR#:    [id number]  YOB: 1955  Date of Evaluation:  12/9/2024  Primary Physician:     Alex Holguin MD   Referring Physician:   SHAHNAZ Gee      CLINICAL INFORMATION:     This patient is a 69 y.o. male with a history of memory loss.     MEDICATIONS:     See MAR.    RECORDING CONDITIONS:     This EEG was performed utilizing standard International 10-20 System of electrode placement, with additional channels monitored for eye movement. One channel electrocardiogram was monitored. Data was obtained, stored, and interpreted according to ACNS guidelines (J Clin Neurophysiol 2006;23(2):) utilizing referential montage recording, with reformatting to longitudinal, transverse bipolar, and referential montages as necessary for interpretation, along with the digital/automated EEG analysis. Patient tolerated entire procedure well. Photic stimulation and hyperventilation were utilized as activation procedures unless otherwise specified below. Recording time was 64 minutes.     E.E.G. DESCRIPTION:     The resting predominant posterior background frequency is a 8 Hz 30-40 uV rhythm.  No overt focal, lateralizing, or paroxysmal abnormalities were noted through the recording. Drowsiness and sleep were not demonstrated.  Hyperventilation was not performed. Photic stimulation was performed and had little change on the recording. Muscle, motion, and eye movement artifacts were noted.       EEG INTERPRETATION:    Normal EEG for age in the awake state.       CLINICAL CORRELATION:     The absence of epileptiform abnormalities does not preclude a clinical diagnosis of seizures.       Ethan Mota DO  Board Certified Neurologist    Date reported: 12/10/2024  Date signed: 12/10/2024

## 2025-01-31 ENCOUNTER — OFFICE VISIT (OUTPATIENT)
Dept: NEUROLOGY | Age: 70
End: 2025-01-31
Payer: MEDICARE

## 2025-01-31 VITALS
HEART RATE: 101 BPM | DIASTOLIC BLOOD PRESSURE: 81 MMHG | HEIGHT: 72 IN | WEIGHT: 241 LBS | BODY MASS INDEX: 32.64 KG/M2 | OXYGEN SATURATION: 96 % | SYSTOLIC BLOOD PRESSURE: 137 MMHG

## 2025-01-31 DIAGNOSIS — Z79.899 MEDICATION MANAGEMENT: ICD-10-CM

## 2025-01-31 DIAGNOSIS — G31.84 MCI (MILD COGNITIVE IMPAIRMENT): ICD-10-CM

## 2025-01-31 DIAGNOSIS — G20.A1 PARKINSON'S DISEASE, UNSPECIFIED WHETHER DYSKINESIA PRESENT, UNSPECIFIED WHETHER MANIFESTATIONS FLUCTUATE (HCC): Primary | ICD-10-CM

## 2025-01-31 DIAGNOSIS — E53.8 B12 DEFICIENCY: ICD-10-CM

## 2025-01-31 PROCEDURE — 1036F TOBACCO NON-USER: CPT | Performed by: NURSE PRACTITIONER

## 2025-01-31 PROCEDURE — 1159F MED LIST DOCD IN RCRD: CPT | Performed by: NURSE PRACTITIONER

## 2025-01-31 PROCEDURE — G8427 DOCREV CUR MEDS BY ELIG CLIN: HCPCS | Performed by: NURSE PRACTITIONER

## 2025-01-31 PROCEDURE — 1123F ACP DISCUSS/DSCN MKR DOCD: CPT | Performed by: NURSE PRACTITIONER

## 2025-01-31 PROCEDURE — G8417 CALC BMI ABV UP PARAM F/U: HCPCS | Performed by: NURSE PRACTITIONER

## 2025-01-31 PROCEDURE — 1160F RVW MEDS BY RX/DR IN RCRD: CPT | Performed by: NURSE PRACTITIONER

## 2025-01-31 PROCEDURE — 99214 OFFICE O/P EST MOD 30 MIN: CPT | Performed by: NURSE PRACTITIONER

## 2025-01-31 PROCEDURE — 3017F COLORECTAL CA SCREEN DOC REV: CPT | Performed by: NURSE PRACTITIONER

## 2025-01-31 RX ORDER — CARBIDOPA AND LEVODOPA 25; 100 MG/1; MG/1
1 TABLET ORAL 3 TIMES DAILY
Qty: 270 TABLET | Refills: 0 | Status: SHIPPED | OUTPATIENT
Start: 2025-01-31 | End: 2025-05-01

## 2025-01-31 NOTE — PROGRESS NOTES
REVIEW OF SYSTEMS    Constitutional: []Fever []Sweats []Chills [] Recent Injury [x] Denies all unless marked  HEENT:[]Headache  [] Head Injury [] Hearing Loss  [] Sore Throat  [] Ear Ache [x] Denies all unless marked  Spine:  [] Neck pain  [] Back pain  [] Sciaticia  [x] Denies all unless marked  Cardiovascular:[]Heart Disease []Palpitations [] Chest Pain   [x] Denies all unless marked  Pulmonary: []Shortness of Breath []Cough   [x] Denies all unless marked  Psychiatric/Behavioral:[] Depression [] Anxiety [x] Denies all unless marked  Gastrointestinal: []Nausea  []Vomiting  []Abdominal Pain  []Constipation  []Diarrhea  [x] Denies all unless marked  Genitourinary:   [] Frequency  [] Urgency  [] Dysuria [] Incontinence  [x] Denies all unless marked  Extremities: []Pain  []Swelling  [x] Denies all unless marked  Musculoskeletal: [] Myalgias  [] Joint Pain  [] Arthritis [] Muscle Cramps [] Muscle Twitches  [x] Denies all unless marked  Sleep: []Insomnia[]Snoring []Restless Legs  []Sleep Apnea  []Daytime Sleepiness  [x] Denies all unless marked  Skin:[] Rash [] Color Change [x] Denies all unless marked   Neurological:[]Visual Disturbance [] Memory Loss []Loss of Balance []Slurred Speech []Weakness []Seizures  [] Dizziness [x] Denies all unless marked      
medications for this visit.       No Known Allergies      REVIEW OF SYSTEMS  Constitutional: []Fever []Sweats []Chills [] Recent Injury [x] Denies all unless marked  HEENT:[]Headache  [] Head Injury [] Hearing Loss  [] Sore Throat  [] Ear Ache [x] Denies all unless marked  Spine:  [] Neck pain  [] Back pain  [] Sciaticia  [x] Denies all unless marked  Cardiovascular:[]Heart Disease []Palpitations [] Chest Pain   [x] Denies all unless marked  Pulmonary: []Shortness of Breath []Cough   [x] Denies all unless marked  Psychiatric/Behavioral:[] Depression [] Anxiety [x] Denies all unless marked  Gastrointestinal: []Nausea  []Vomiting  []Abdominal Pain  []Constipation  []Diarrhea  [x] Denies all unless marked  Genitourinary:   [] Frequency  [] Urgency  [] Dysuria [] Incontinence  [x] Denies all unless marked  Extremities: []Pain  []Swelling  [x] Denies all unless marked  Musculoskeletal: [] Myalgias  [] Joint Pain  [] Arthritis [] Muscle Cramps [] Muscle Twitches  [x] Denies all unless marked  Sleep: []Insomnia[]Snoring []Restless Legs  []Sleep Apnea  []Daytime Sleepiness  [x] Denies all unless marked  Skin:[] Rash [] Color Change [x] Denies all unless marked   Neurological:[]Visual Disturbance [] Memory Loss []Loss of Balance []Slurred Speech []Weakness []Seizures  [] Dizziness [x] Denies all unless marked    The MA has completed the ROS with the patient. I have reviewed it in its' entirety with the patient and agree with the documentation.     PHYSICAL EXAM  /81   Pulse (!) 101   Ht 1.829 m (6')   Wt 109.3 kg (241 lb)   SpO2 96%   BMI 32.69 kg/m²       Constitutional - No acute distress    HEENT- Conjunctiva normal.  No scars, masses, or lesions over external nose or ears, no neck masses noted, no jugular vein distension, no bruit  Cardiac- Regular rate and rhythm  Pulmonary- Good expansion, normal effort without use of accessory muscles  Musculoskeletal - No significant wasting of muscles noted, no bony

## 2025-05-01 ENCOUNTER — OFFICE VISIT (OUTPATIENT)
Dept: NEUROLOGY | Age: 70
End: 2025-05-01
Payer: MEDICARE

## 2025-05-01 ENCOUNTER — RESULTS FOLLOW-UP (OUTPATIENT)
Dept: NEUROLOGY | Age: 70
End: 2025-05-01

## 2025-05-01 VITALS
SYSTOLIC BLOOD PRESSURE: 143 MMHG | HEART RATE: 91 BPM | BODY MASS INDEX: 32.64 KG/M2 | DIASTOLIC BLOOD PRESSURE: 86 MMHG | WEIGHT: 241 LBS | OXYGEN SATURATION: 93 % | HEIGHT: 72 IN

## 2025-05-01 DIAGNOSIS — R26.9 GAIT ABNORMALITY: ICD-10-CM

## 2025-05-01 DIAGNOSIS — E53.8 B12 DEFICIENCY: ICD-10-CM

## 2025-05-01 DIAGNOSIS — R26.89 IMBALANCE: ICD-10-CM

## 2025-05-01 DIAGNOSIS — G31.84 MCI (MILD COGNITIVE IMPAIRMENT): ICD-10-CM

## 2025-05-01 DIAGNOSIS — R29.6 FALLS: ICD-10-CM

## 2025-05-01 DIAGNOSIS — Z79.899 MEDICATION MANAGEMENT: ICD-10-CM

## 2025-05-01 DIAGNOSIS — E53.8 B12 DEFICIENCY: Primary | ICD-10-CM

## 2025-05-01 DIAGNOSIS — R25.1 TREMOR: ICD-10-CM

## 2025-05-01 DIAGNOSIS — G20.A1 PARKINSON'S DISEASE, UNSPECIFIED WHETHER DYSKINESIA PRESENT, UNSPECIFIED WHETHER MANIFESTATIONS FLUCTUATE (HCC): ICD-10-CM

## 2025-05-01 LAB — VIT B12 SERPL-MCNC: 450 PG/ML (ref 232–1245)

## 2025-05-01 PROCEDURE — 3017F COLORECTAL CA SCREEN DOC REV: CPT | Performed by: NURSE PRACTITIONER

## 2025-05-01 PROCEDURE — 1160F RVW MEDS BY RX/DR IN RCRD: CPT | Performed by: NURSE PRACTITIONER

## 2025-05-01 PROCEDURE — G8417 CALC BMI ABV UP PARAM F/U: HCPCS | Performed by: NURSE PRACTITIONER

## 2025-05-01 PROCEDURE — G8427 DOCREV CUR MEDS BY ELIG CLIN: HCPCS | Performed by: NURSE PRACTITIONER

## 2025-05-01 PROCEDURE — 1159F MED LIST DOCD IN RCRD: CPT | Performed by: NURSE PRACTITIONER

## 2025-05-01 PROCEDURE — 1036F TOBACCO NON-USER: CPT | Performed by: NURSE PRACTITIONER

## 2025-05-01 PROCEDURE — 1123F ACP DISCUSS/DSCN MKR DOCD: CPT | Performed by: NURSE PRACTITIONER

## 2025-05-01 PROCEDURE — 99214 OFFICE O/P EST MOD 30 MIN: CPT | Performed by: NURSE PRACTITIONER

## 2025-05-01 RX ORDER — GLIMEPIRIDE 2 MG/1
TABLET ORAL
COMMUNITY
Start: 2025-04-03

## 2025-05-01 RX ORDER — ROPINIROLE 0.25 MG/1
0.25 TABLET, FILM COATED ORAL 3 TIMES DAILY
Qty: 270 TABLET | Refills: 0 | Status: SHIPPED | OUTPATIENT
Start: 2025-05-01 | End: 2025-07-30

## 2025-05-01 NOTE — PROGRESS NOTES
REVIEW OF SYSTEMS    Constitutional: []Fever []Sweats []Chills [] Recent Injury [x] Denies all unless marked  HEENT:[]Headache  [] Head Injury [] Hearing Loss  [] Sore Throat  [] Ear Ache [x] Denies all unless marked  Spine:  [] Neck pain  [] Back pain  [] Sciaticia  [x] Denies all unless marked  Cardiovascular:[]Heart Disease []Palpitations [] Chest Pain   [x] Denies all unless marked  Pulmonary: []Shortness of Breath []Cough   [x] Denies all unless marked  Psychiatric/Behavioral:[] Depression [] Anxiety [x] Denies all unless marked  Gastrointestinal: []Nausea  []Vomiting  []Abdominal Pain  []Constipation  []Diarrhea  [x] Denies all unless marked  Genitourinary:   [] Frequency  [] Urgency  [] Dysuria [] Incontinence  [x] Denies all unless marked  Extremities: []Pain  []Swelling  [x] Denies all unless marked  Musculoskeletal: [] Myalgias  [] Joint Pain  [] Arthritis [] Muscle Cramps [] Muscle Twitches  [x] Denies all unless marked  Sleep: []Insomnia[]Snoring []Restless Legs  []Sleep Apnea  []Daytime Sleepiness  [x] Denies all unless marked  Skin:[] Rash [] Color Change [x] Denies all unless marked   Neurological:[]Visual Disturbance [] Memory Loss []Loss of Balance []Slurred Speech []Weakness []Seizures  [] Dizziness [x] Denies all unless marked      
release capsule Take 1 capsule by mouth Daily Indications: Reflux Gastritis      metFORMIN (GLUCOPHAGE) 850 MG tablet Take 1 tablet by mouth 2 times daily (with meals) Indications: Diabetes      benazepril (LOTENSIN) 10 MG tablet Take 1 tablet by mouth daily Indications: High Blood Pressure      atorvastatin (LIPITOR) 10 MG tablet Take 1 tablet by mouth daily Indications: CHANGES IN CHOLESTEROL (INACTIVE)      Tirzepatide (MOUNJARO) 2.5 MG/0.5ML SOAJ Inject into the skin (Patient not taking: Reported on 5/1/2025)       No current facility-administered medications for this visit.       No Known Allergies      REVIEW OF SYSTEMS  Constitutional: []Fever []Sweats []Chills [] Recent Injury [x] Denies all unless marked  HEENT:[]Headache  [] Head Injury [] Hearing Loss  [] Sore Throat  [] Ear Ache [x] Denies all unless marked  Spine:  [] Neck pain  [] Back pain  [] Sciaticia  [x] Denies all unless marked  Cardiovascular:[]Heart Disease []Palpitations [] Chest Pain   [x] Denies all unless marked  Pulmonary: []Shortness of Breath []Cough   [x] Denies all unless marked  Psychiatric/Behavioral:[] Depression [] Anxiety [x] Denies all unless marked  Gastrointestinal: []Nausea  []Vomiting  []Abdominal Pain  []Constipation  []Diarrhea  [x] Denies all unless marked  Genitourinary:   [] Frequency  [] Urgency  [] Dysuria [] Incontinence  [x] Denies all unless marked  Extremities: []Pain  []Swelling  [x] Denies all unless marked  Musculoskeletal: [] Myalgias  [] Joint Pain  [] Arthritis [] Muscle Cramps [] Muscle Twitches  [x] Denies all unless marked  Sleep: []Insomnia[]Snoring []Restless Legs  []Sleep Apnea  []Daytime Sleepiness  [x] Denies all unless marked  Skin:[] Rash [] Color Change [x] Denies all unless marked   Neurological:[]Visual Disturbance [] Memory Loss []Loss of Balance []Slurred Speech []Weakness []Seizures  [] Dizziness [x] Denies all unless marked    The MA has completed the ROS with the patient. I have reviewed it

## 2025-05-01 NOTE — TELEPHONE ENCOUNTER
----- Message from SHAHNAZ Gee CNP sent at 5/1/2025  2:56 PM CDT -----  Can come off of injection therapy.  Recommend over-the-counter B12 supplement for maintenance.

## 2025-05-28 ENCOUNTER — TELEPHONE (OUTPATIENT)
Dept: NEUROLOGY | Age: 70
End: 2025-05-28

## 2025-05-28 NOTE — TELEPHONE ENCOUNTER
Matt called and stated that he needs to come off the requip 0.25 mg that he is taken 3 times daily. Matt stated that since he started taking this medication he has gotten worse with his symptoms of falling more being weak and just his over all health. Patient would like to know how to stop this medication and what the next step will be to help him get better.

## 2025-05-29 NOTE — TELEPHONE ENCOUNTER
Called and spoke with Matt and provided him with the following information.  Susan Leiva, APRN - CNP  You14 hours ago (6:19 PM)       He can start taking it twice daily for 2 days then once daily for 2 days then discontinue it.   Patient voiced understanding

## 2025-06-10 ENCOUNTER — OFFICE VISIT (OUTPATIENT)
Dept: NEUROLOGY | Age: 70
End: 2025-06-10
Payer: MEDICARE

## 2025-06-10 VITALS
OXYGEN SATURATION: 96 % | HEIGHT: 72 IN | DIASTOLIC BLOOD PRESSURE: 84 MMHG | BODY MASS INDEX: 32.64 KG/M2 | SYSTOLIC BLOOD PRESSURE: 143 MMHG | HEART RATE: 92 BPM | WEIGHT: 241 LBS

## 2025-06-10 DIAGNOSIS — R26.9 GAIT ABNORMALITY: ICD-10-CM

## 2025-06-10 DIAGNOSIS — Z79.899 MEDICATION MANAGEMENT: ICD-10-CM

## 2025-06-10 DIAGNOSIS — G31.84 MCI (MILD COGNITIVE IMPAIRMENT): ICD-10-CM

## 2025-06-10 DIAGNOSIS — G20.A1 PARKINSON'S DISEASE, UNSPECIFIED WHETHER DYSKINESIA PRESENT, UNSPECIFIED WHETHER MANIFESTATIONS FLUCTUATE (HCC): Primary | ICD-10-CM

## 2025-06-10 DIAGNOSIS — R25.1 TREMOR: ICD-10-CM

## 2025-06-10 PROCEDURE — G8427 DOCREV CUR MEDS BY ELIG CLIN: HCPCS | Performed by: NURSE PRACTITIONER

## 2025-06-10 PROCEDURE — 1160F RVW MEDS BY RX/DR IN RCRD: CPT | Performed by: NURSE PRACTITIONER

## 2025-06-10 PROCEDURE — 99214 OFFICE O/P EST MOD 30 MIN: CPT | Performed by: NURSE PRACTITIONER

## 2025-06-10 PROCEDURE — 1036F TOBACCO NON-USER: CPT | Performed by: NURSE PRACTITIONER

## 2025-06-10 PROCEDURE — 3017F COLORECTAL CA SCREEN DOC REV: CPT | Performed by: NURSE PRACTITIONER

## 2025-06-10 PROCEDURE — 1159F MED LIST DOCD IN RCRD: CPT | Performed by: NURSE PRACTITIONER

## 2025-06-10 PROCEDURE — G8417 CALC BMI ABV UP PARAM F/U: HCPCS | Performed by: NURSE PRACTITIONER

## 2025-06-10 PROCEDURE — 1123F ACP DISCUSS/DSCN MKR DOCD: CPT | Performed by: NURSE PRACTITIONER

## 2025-06-10 RX ORDER — AMANTADINE HYDROCHLORIDE 100 MG/1
100 CAPSULE, GELATIN COATED ORAL 2 TIMES DAILY
Qty: 180 CAPSULE | Refills: 0 | Status: SHIPPED | OUTPATIENT
Start: 2025-06-10 | End: 2025-09-08

## 2025-06-10 NOTE — PROGRESS NOTES
REVIEW OF SYSTEMS    Constitutional: []Fever []Sweats []Chills [] Recent Injury [x] Denies all unless marked  HEENT:[]Headache  [] Head Injury [] Hearing Loss  [] Sore Throat  [] Ear Ache [x] Denies all unless marked  Spine:  [] Neck pain  [] Back pain  [] Sciaticia  [x] Denies all unless marked  Cardiovascular:[]Heart Disease []Palpitations [] Chest Pain   [x] Denies all unless marked  Pulmonary: []Shortness of Breath []Cough   [x] Denies all unless marked  Psychiatric/Behavioral:[] Depression [] Anxiety [x] Denies all unless marked  Gastrointestinal: []Nausea  []Vomiting  []Abdominal Pain  []Constipation  []Diarrhea  [x] Denies all unless marked  Genitourinary:   [] Frequency  [] Urgency  [] Dysuria [] Incontinence  [x] Denies all unless marked  Extremities: []Pain  []Swelling  [x] Denies all unless marked  Musculoskeletal: [] Myalgias  [] Joint Pain  [] Arthritis [] Muscle Cramps [] Muscle Twitches  [x] Denies all unless marked  Sleep: []Insomnia[]Snoring []Restless Legs  []Sleep Apnea  []Daytime Sleepiness  [x] Denies all unless marked  Skin:[] Rash [] Color Change [x] Denies all unless marked   Neurological:[]Visual Disturbance [] Memory Loss []Loss of Balance []Slurred Speech []Weakness []Seizures  [] Dizziness [x] Denies all unless marked      
HPI)    MRI Brain W WO 8/30/24  Findings:    No diffusion signal abnormality. No intra-axial or extra-axial  hemorrhage. No intracranial mass lesion or pathologic enhancement. The  ventricles, cortical sulci and basal cisterns are symmetric and age  appropriate. Pituitary gland and sella are unremarkable. The major  intracranial flow-voids are preserved. Orbital contents are  unremarkable. The paranasal sinuses are clear. Normal bone marrow  signal.    There is normal course and caliber of cranial nerves VII and VIII. There  are no filling defects within the internal auditory canals. There are no  enhancing masses in the posterior fossa or IACs. Type II IAC AICA loop  on the right. The inner ear structures have normal fluid signal. Distal  carotids and jugular bulbs are unremarkable. No otic capsule pathologic  enhancement. No temporal bone diffusion signal abnormality.    At the inferior most aspect of the field-of-view there appears to be  some retroodontoid soft tissue pannus resulting in a spinal stenosis at  the level of the skull base (series [id number] [id number]).    Impression:     1. There appears to be an acquired spinal stenosis just below the skull   base due to a prominent retroodontoid soft tissue pannus. If there are   symptoms of spinal stenosis/cervical myelopathy you may consider   dedicated cervical spine MRI for further evaluation.   2. Otherwise normal contrast-enhanced brain MRI. There is a type II AICA   loop extending into the right IAC but this is most commonly an anatomic   variation as opposed to symptomatic finding.     This report was signed and finalized on 8/30/2024 4:31 PM by Dr Camron Ocasio.     Labs 11/22/24  Beta amyloid 42/40 ratio high, not consistent with underlying AD.   B12- 324      EEG 12/9/24  Normal EEG for age in the awake state.       Reviewed records   Previously reviewed large amount of records brought in by patient as well    ASSESSMENT:    Matt Hernandez is a 70 y.o.

## 2025-06-12 ENCOUNTER — TRANSCRIBE ORDERS (OUTPATIENT)
Dept: ADMINISTRATIVE | Facility: HOSPITAL | Age: 70
End: 2025-06-12
Payer: MEDICARE

## 2025-06-12 DIAGNOSIS — Z13.9 SCREENING FOR UNSPECIFIED CONDITION: Primary | ICD-10-CM

## 2025-06-13 ENCOUNTER — HOSPITAL ENCOUNTER (OUTPATIENT)
Dept: CT IMAGING | Facility: HOSPITAL | Age: 70
Discharge: HOME | End: 2025-06-13

## 2025-06-13 DIAGNOSIS — Z13.9 SCREENING FOR UNSPECIFIED CONDITION: ICD-10-CM

## 2025-06-13 PROCEDURE — 71271 CT THORAX LUNG CANCER SCR C-: CPT

## 2025-09-04 ENCOUNTER — OFFICE VISIT (OUTPATIENT)
Dept: NEUROLOGY | Age: 70
End: 2025-09-04
Payer: MEDICARE

## 2025-09-04 VITALS
OXYGEN SATURATION: 99 % | HEART RATE: 99 BPM | HEIGHT: 72 IN | WEIGHT: 241 LBS | SYSTOLIC BLOOD PRESSURE: 137 MMHG | DIASTOLIC BLOOD PRESSURE: 81 MMHG | BODY MASS INDEX: 32.64 KG/M2

## 2025-09-04 DIAGNOSIS — G20.A1 PARKINSON'S DISEASE, UNSPECIFIED WHETHER DYSKINESIA PRESENT, UNSPECIFIED WHETHER MANIFESTATIONS FLUCTUATE (HCC): ICD-10-CM

## 2025-09-04 DIAGNOSIS — R26.89 IMBALANCE: ICD-10-CM

## 2025-09-04 DIAGNOSIS — R26.9 GAIT ABNORMALITY: Primary | ICD-10-CM

## 2025-09-04 DIAGNOSIS — R25.1 TREMOR: ICD-10-CM

## 2025-09-04 DIAGNOSIS — R29.6 FALLS: ICD-10-CM

## 2025-09-04 DIAGNOSIS — G31.84 MCI (MILD COGNITIVE IMPAIRMENT): ICD-10-CM

## 2025-09-04 PROCEDURE — 1159F MED LIST DOCD IN RCRD: CPT | Performed by: NURSE PRACTITIONER

## 2025-09-04 PROCEDURE — 1123F ACP DISCUSS/DSCN MKR DOCD: CPT | Performed by: NURSE PRACTITIONER

## 2025-09-04 PROCEDURE — 1036F TOBACCO NON-USER: CPT | Performed by: NURSE PRACTITIONER

## 2025-09-04 PROCEDURE — 1160F RVW MEDS BY RX/DR IN RCRD: CPT | Performed by: NURSE PRACTITIONER

## 2025-09-04 PROCEDURE — 99214 OFFICE O/P EST MOD 30 MIN: CPT | Performed by: NURSE PRACTITIONER

## 2025-09-04 PROCEDURE — G8417 CALC BMI ABV UP PARAM F/U: HCPCS | Performed by: NURSE PRACTITIONER

## 2025-09-04 PROCEDURE — 3017F COLORECTAL CA SCREEN DOC REV: CPT | Performed by: NURSE PRACTITIONER

## 2025-09-04 PROCEDURE — G8427 DOCREV CUR MEDS BY ELIG CLIN: HCPCS | Performed by: NURSE PRACTITIONER

## 2025-09-04 RX ORDER — ORAL SEMAGLUTIDE 7 MG/1
1 TABLET ORAL DAILY
COMMUNITY
Start: 2025-07-29

## 2025-09-04 RX ORDER — AMANTADINE HYDROCHLORIDE 100 MG/1
100 TABLET ORAL 2 TIMES DAILY
Qty: 30 TABLET | Refills: 3 | Status: SHIPPED | OUTPATIENT
Start: 2025-09-04

## (undated) DEVICE — THE CHANNEL CLEANING BRUSH IS A NYLON FLEXI BRUSH ATTACHED TO A FLEXIBLE PLASTIC SHEATH DESIGNED TO SAFELY REMOVE DEBRIS FROM FLEXIBLE ENDOSCOPES.

## (undated) DEVICE — CANNULA SEAL

## (undated) DEVICE — GLOVE SURG SZ 7 CRM LTX FREE POLYISOPRENE POLYMER BEAD ANTI

## (undated) DEVICE — MASK,OXYGEN,MED CONC,ADLT,7' TUB, UC: Brand: PENDING

## (undated) DEVICE — YANKAUER,BULB TIP WITH VENT: Brand: ARGYLE

## (undated) DEVICE — BLADELESS OBTURATOR: Brand: WECK VISTA

## (undated) DEVICE — FRCP BIOP ENDO CAPTURAPRO SPK SERR 2.8MM 230CM

## (undated) DEVICE — SOLUTION IV IRRIG POUR BRL 0.9% SODIUM CHL 2F7124

## (undated) DEVICE — SUTURE SZ 0 27IN 5/8 CIR UR-6  TAPER PT VIOLET ABSRB VICRYL J603H

## (undated) DEVICE — SUTURE VCRL SZ 0 L27IN ABSRB VLT L36MM UR-5 5/8 CIR J376H

## (undated) DEVICE — PATIENT RETURN ELECTRODE, SINGLE-USE, CONTACT QUALITY MONITORING, ADULT, WITH 9FT CORD, FOR PATIENTS WEIGING OVER 33LBS. (15KG): Brand: MEGADYNE

## (undated) DEVICE — SNAR POLYP SENSATION MICRO OVL 13 240X40

## (undated) DEVICE — SUTURE MCRYL SZ 4-0 L18IN ABSRB UD L19MM PS-2 3/8 CIR PRIM Y496G

## (undated) DEVICE — SUCTION IRRIGATOR: Brand: ENDOWRIST

## (undated) DEVICE — GENERAL LAP CDS

## (undated) DEVICE — ADHESIVE SKIN CLSR 0.7ML TOP DERMBND ADV

## (undated) DEVICE — Device: Brand: DEFENDO AIR/WATER/SUCTION AND BIOPSY VALVE

## (undated) DEVICE — CUFF BLD PRESSURE 1 TUBE AD 25-34 CM ARM VLY FLEXIPORT DISP

## (undated) DEVICE — ARM DRAPE

## (undated) DEVICE — CUFF,BP,DISP,1 TUBE,ADULT,HP: Brand: MEDLINE

## (undated) DEVICE — SENSR O2 OXIMAX FNGR A/ 18IN NONSTR

## (undated) DEVICE — TBG SMPL FLTR LINE NASL 02/C02 A/ BX/100

## (undated) DEVICE — COVER,MAYO STAND,STERILE: Brand: MEDLINE

## (undated) DEVICE — SUTURE VCRL SZ 0 L36IN ABSRB UD L36MM CT-1 1/2 CIR J946H

## (undated) DEVICE — THE SINGLE USE ETRAP – POLYP TRAP IS USED FOR SUCTION RETRIEVAL OF ENDOSCOPICALLY REMOVED POLYPS.: Brand: ETRAP

## (undated) DEVICE — GOWN,PREVENTION PLUS,XL,ST,24/CS: Brand: MEDLINE

## (undated) DEVICE — Z DISCONTINUED NO SUB IDED PLATE DISP SPL TYP ERBE NESSY P FOR PSD-60

## (undated) DEVICE — CLIPVAC PRESURG HAIR REMOVAL

## (undated) DEVICE — TISSUE RETRIEVAL SYSTEM: Brand: INZII RETRIEVAL SYSTEM

## (undated) DEVICE — Z DUP USE 2641840 CLIP INT L POLYMER LOK LIG HEM O LOK